# Patient Record
Sex: MALE | Race: WHITE | NOT HISPANIC OR LATINO | URBAN - METROPOLITAN AREA
[De-identification: names, ages, dates, MRNs, and addresses within clinical notes are randomized per-mention and may not be internally consistent; named-entity substitution may affect disease eponyms.]

---

## 2017-01-26 PROBLEM — Z00.00 ENCOUNTER FOR PREVENTIVE HEALTH EXAMINATION: Status: ACTIVE | Noted: 2017-01-26

## 2018-03-12 ENCOUNTER — INPATIENT (INPATIENT)
Facility: HOSPITAL | Age: 33
LOS: 7 days | Discharge: HOME | End: 2018-03-20
Attending: PSYCHIATRY & NEUROLOGY | Admitting: PSYCHIATRY & NEUROLOGY
Payer: COMMERCIAL

## 2018-03-12 VITALS — HEIGHT: 70 IN | WEIGHT: 212.08 LBS

## 2018-03-12 LAB
AMPHET UR-MCNC: NEGATIVE — SIGNIFICANT CHANGE UP
ANION GAP SERPL CALC-SCNC: 8 MMOL/L — SIGNIFICANT CHANGE UP (ref 5–17)
ANISOCYTOSIS BLD QL: SIGNIFICANT CHANGE UP
APAP SERPL-MCNC: < 2 UG/ML — SIGNIFICANT CHANGE UP (ref 10–30)
APPEARANCE UR: CLEAR — SIGNIFICANT CHANGE UP
BARBITURATES UR SCN-MCNC: NEGATIVE — SIGNIFICANT CHANGE UP
BASOPHILS # BLD AUTO: 0.02 K/UL — SIGNIFICANT CHANGE UP (ref 0–0.2)
BASOPHILS NFR BLD AUTO: 0.2 % — SIGNIFICANT CHANGE UP (ref 0–2)
BENZODIAZ UR-MCNC: NEGATIVE — SIGNIFICANT CHANGE UP
BILIRUB UR-MCNC: NEGATIVE — SIGNIFICANT CHANGE UP
BUN SERPL-MCNC: 11 MG/DL — SIGNIFICANT CHANGE UP (ref 7–23)
CALCIUM SERPL-MCNC: 9.8 MG/DL — SIGNIFICANT CHANGE UP (ref 8.5–10.1)
CHLORIDE SERPL-SCNC: 104 MMOL/L — SIGNIFICANT CHANGE UP (ref 96–108)
CO2 SERPL-SCNC: 27 MMOL/L — SIGNIFICANT CHANGE UP (ref 22–31)
COCAINE METAB.OTHER UR-MCNC: NEGATIVE — SIGNIFICANT CHANGE UP
COLOR SPEC: YELLOW — SIGNIFICANT CHANGE UP
CREAT SERPL-MCNC: 0.97 MG/DL — SIGNIFICANT CHANGE UP (ref 0.5–1.3)
DACRYOCYTES BLD QL SMEAR: SLIGHT — SIGNIFICANT CHANGE UP
DIFF PNL FLD: NEGATIVE — SIGNIFICANT CHANGE UP
EOSINOPHIL # BLD AUTO: 0.29 K/UL — SIGNIFICANT CHANGE UP (ref 0–0.5)
EOSINOPHIL NFR BLD AUTO: 3.3 % — SIGNIFICANT CHANGE UP (ref 0–6)
ETHANOL SERPL-MCNC: <10 MG/DL — SIGNIFICANT CHANGE UP (ref 0–10)
GLUCOSE SERPL-MCNC: 121 MG/DL — HIGH (ref 70–99)
GLUCOSE UR QL: NEGATIVE MG/DL — SIGNIFICANT CHANGE UP
HCT VFR BLD CALC: 37.9 % — LOW (ref 39–50)
HGB BLD-MCNC: 11.5 G/DL — LOW (ref 13–17)
HYPOCHROMIA BLD QL: SLIGHT — SIGNIFICANT CHANGE UP
IMM GRANULOCYTES NFR BLD AUTO: 1.1 % — SIGNIFICANT CHANGE UP (ref 0–1.5)
KETONES UR-MCNC: NEGATIVE — SIGNIFICANT CHANGE UP
LEUKOCYTE ESTERASE UR-ACNC: (no result)
LG PLATELETS BLD QL AUTO: SLIGHT — SIGNIFICANT CHANGE UP
LYMPHOCYTES # BLD AUTO: 2.33 K/UL — SIGNIFICANT CHANGE UP (ref 1–3.3)
LYMPHOCYTES # BLD AUTO: 26.4 % — SIGNIFICANT CHANGE UP (ref 13–44)
MACROCYTES BLD QL: SLIGHT — SIGNIFICANT CHANGE UP
MANUAL SMEAR VERIFICATION: SIGNIFICANT CHANGE UP
MCHC RBC-ENTMCNC: 20.2 PG — LOW (ref 27–34)
MCHC RBC-ENTMCNC: 30.3 GM/DL — LOW (ref 32–36)
MCV RBC AUTO: 66.6 FL — LOW (ref 80–100)
METHADONE UR-MCNC: NEGATIVE — SIGNIFICANT CHANGE UP
MICROCYTES BLD QL: SLIGHT — SIGNIFICANT CHANGE UP
MONOCYTES # BLD AUTO: 0.71 K/UL — SIGNIFICANT CHANGE UP (ref 0–0.9)
MONOCYTES NFR BLD AUTO: 8 % — SIGNIFICANT CHANGE UP (ref 2–14)
NEUTROPHILS # BLD AUTO: 5.39 K/UL — SIGNIFICANT CHANGE UP (ref 1.8–7.4)
NEUTROPHILS NFR BLD AUTO: 61 % — SIGNIFICANT CHANGE UP (ref 43–77)
NITRITE UR-MCNC: NEGATIVE — SIGNIFICANT CHANGE UP
NRBC # BLD: 0 /100 WBCS — SIGNIFICANT CHANGE UP (ref 0–0)
OPIATES UR-MCNC: NEGATIVE — SIGNIFICANT CHANGE UP
PCP SPEC-MCNC: SIGNIFICANT CHANGE UP
PCP UR-MCNC: NEGATIVE — SIGNIFICANT CHANGE UP
PH UR: 7 — SIGNIFICANT CHANGE UP (ref 5–8)
PLAT MORPH BLD: NORMAL — SIGNIFICANT CHANGE UP
PLATELET # BLD AUTO: 452 K/UL — HIGH (ref 150–400)
POIKILOCYTOSIS BLD QL AUTO: SLIGHT — SIGNIFICANT CHANGE UP
POTASSIUM SERPL-MCNC: 3.8 MMOL/L — SIGNIFICANT CHANGE UP (ref 3.5–5.3)
POTASSIUM SERPL-SCNC: 3.8 MMOL/L — SIGNIFICANT CHANGE UP (ref 3.5–5.3)
PROT UR-MCNC: NEGATIVE MG/DL — SIGNIFICANT CHANGE UP
RBC # BLD: 5.69 M/UL — SIGNIFICANT CHANGE UP (ref 4.2–5.8)
RBC # FLD: 16.4 % — HIGH (ref 10.3–14.5)
RBC BLD AUTO: (no result)
SALICYLATES SERPL-MCNC: <1.7 MG/DL — LOW (ref 2.8–20)
SODIUM SERPL-SCNC: 139 MMOL/L — SIGNIFICANT CHANGE UP (ref 135–145)
SP GR SPEC: 1.01 — SIGNIFICANT CHANGE UP (ref 1.01–1.02)
TARGETS BLD QL SMEAR: SLIGHT — SIGNIFICANT CHANGE UP
THC UR QL: NEGATIVE — SIGNIFICANT CHANGE UP
UROBILINOGEN FLD QL: NEGATIVE MG/DL — SIGNIFICANT CHANGE UP
WBC # BLD: 8.84 K/UL — SIGNIFICANT CHANGE UP (ref 3.8–10.5)
WBC # FLD AUTO: 8.84 K/UL — SIGNIFICANT CHANGE UP (ref 3.8–10.5)

## 2018-03-12 PROCEDURE — 99285 EMERGENCY DEPT VISIT HI MDM: CPT

## 2018-03-12 PROCEDURE — 90792 PSYCH DIAG EVAL W/MED SRVCS: CPT | Mod: GT

## 2018-03-12 PROCEDURE — 93010 ELECTROCARDIOGRAM REPORT: CPT

## 2018-03-12 NOTE — ED PROVIDER NOTE - CARE PLAN
Principal Discharge DX:	Suicidal ideations  Secondary Diagnosis:	Depression, unspecified depression type  Secondary Diagnosis:	Bipolar affective disorder, remission status unspecified

## 2018-03-12 NOTE — ED PROVIDER NOTE - OBJECTIVE STATEMENT
32 male with PMHx of anxiety, depression, Bipolar disorder presents to the ED c/o worsening SI over the last few days. Pt states that he has "come close" to a plan in the last few days, prompting ED visit. +depression. Denies CP, abd pain, SOB, fever. Lexapro recently added for worsening sx, has been taking for 3 days. Notes increased stress that pt is having difficulty coping with. Hospitalized in Oct 2013 for depressive sx. No previous suicidal attempts. Last saw psychiatrist last week. No EtOH, nonsmoker. 31 y/o male with PMHx of anxiety, depression, Bipolar disorder presents to the ED c/o worsening SI over the last few days. Pt states that he has "come close" to a plan in the last few days, prompting ED visit. +depression. Denies CP, abd pain, SOB, fever. Lexapro recently added for worsening sx, has been taking for 3 days. Notes increased stress that pt is having difficulty coping with. Hospitalized in Oct 2013 for depressive sx. No previous suicidal attempts. Last saw psychiatrist last week. No EtOH, nonsmoker.

## 2018-03-12 NOTE — ED PROVIDER NOTE - NS_ ATTENDINGSCRIBEDETAILS _ED_A_ED_FT
I, Laith Mg MD,  performed the initial face to face bedside interview with this patient regarding history of present illness, review of symptoms and relevant past medical, social and family history.  I completed an independent physical examination.    The history, relevant review of systems, past medical and surgical history, medical decision making, and physical examination was documented by the scribe in my presence and I attest to the accuracy of the documentation.

## 2018-03-12 NOTE — ED PROVIDER NOTE - PROGRESS NOTE DETAILS
ED Attending Dr. Mg spoke with telepsych who states that pt will be added to list of patients to be seen in ED. pt evaluated by telepsych and recommend admission for further w/u.  no current beds available.  will hold in ED until bed available s/o to [place] to f/u with psych placement
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
## 2018-03-12 NOTE — ED ADULT TRIAGE NOTE - CHIEF COMPLAINT QUOTE
pt states increasing thoughts of SI since this year. in therapy for SI and states thoughts of SI are increasing.

## 2018-03-13 DIAGNOSIS — F60.3 BORDERLINE PERSONALITY DISORDER: ICD-10-CM

## 2018-03-13 DIAGNOSIS — F31.4 BIPOLAR DISORDER, CURRENT EPISODE DEPRESSED, SEVERE, WITHOUT PSYCHOTIC FEATURES: ICD-10-CM

## 2018-03-13 LAB
ADD ON TEST-SPECIMEN IN LAB: SIGNIFICANT CHANGE UP
BACTERIA # UR AUTO: (no result)
EPI CELLS # UR: SIGNIFICANT CHANGE UP
LITHIUM SERPL-MCNC: 0.7 MMOL/L — SIGNIFICANT CHANGE UP (ref 0.6–1.2)
RBC CASTS # UR COMP ASSIST: SIGNIFICANT CHANGE UP /HPF (ref 0–4)
WBC UR QL: SIGNIFICANT CHANGE UP

## 2018-03-13 PROCEDURE — 93010 ELECTROCARDIOGRAM REPORT: CPT

## 2018-03-13 RX ORDER — BUPROPION HYDROCHLORIDE 150 MG/1
300 TABLET, EXTENDED RELEASE ORAL DAILY
Qty: 0 | Refills: 0 | Status: DISCONTINUED | OUTPATIENT
Start: 2018-03-13 | End: 2018-03-20

## 2018-03-13 RX ORDER — ACETAMINOPHEN 500 MG
650 TABLET ORAL EVERY 6 HOURS
Qty: 0 | Refills: 0 | Status: DISCONTINUED | OUTPATIENT
Start: 2018-03-13 | End: 2018-03-20

## 2018-03-13 RX ORDER — LITHIUM CARBONATE 300 MG/1
600 TABLET, EXTENDED RELEASE ORAL
Qty: 0 | Refills: 0 | Status: DISCONTINUED | OUTPATIENT
Start: 2018-03-13 | End: 2018-03-14

## 2018-03-13 RX ORDER — DIPHENHYDRAMINE HCL 50 MG
50 CAPSULE ORAL ONCE
Qty: 0 | Refills: 0 | Status: DISCONTINUED | OUTPATIENT
Start: 2018-03-13 | End: 2018-03-20

## 2018-03-13 RX ORDER — LAMOTRIGINE 25 MG/1
50 TABLET, ORALLY DISINTEGRATING ORAL DAILY
Qty: 0 | Refills: 0 | Status: DISCONTINUED | OUTPATIENT
Start: 2018-03-13 | End: 2018-03-14

## 2018-03-13 RX ORDER — TRAZODONE HCL 50 MG
50 TABLET ORAL AT BEDTIME
Qty: 0 | Refills: 0 | Status: DISCONTINUED | OUTPATIENT
Start: 2018-03-13 | End: 2018-03-14

## 2018-03-13 RX ORDER — FLUOXETINE HCL 10 MG
60 CAPSULE ORAL DAILY
Qty: 0 | Refills: 0 | Status: DISCONTINUED | OUTPATIENT
Start: 2018-03-13 | End: 2018-03-15

## 2018-03-13 RX ORDER — HALOPERIDOL DECANOATE 100 MG/ML
5 INJECTION INTRAMUSCULAR ONCE
Qty: 0 | Refills: 0 | Status: DISCONTINUED | OUTPATIENT
Start: 2018-03-13 | End: 2018-03-20

## 2018-03-13 RX ORDER — ESCITALOPRAM OXALATE 10 MG/1
10 TABLET, FILM COATED ORAL DAILY
Qty: 0 | Refills: 0 | Status: DISCONTINUED | OUTPATIENT
Start: 2018-03-13 | End: 2018-03-13

## 2018-03-13 RX ADMIN — BUPROPION HYDROCHLORIDE 300 MILLIGRAM(S): 150 TABLET, EXTENDED RELEASE ORAL at 12:14

## 2018-03-13 RX ADMIN — LITHIUM CARBONATE 600 MILLIGRAM(S): 300 TABLET, EXTENDED RELEASE ORAL at 22:26

## 2018-03-13 RX ADMIN — LAMOTRIGINE 50 MILLIGRAM(S): 25 TABLET, ORALLY DISINTEGRATING ORAL at 12:14

## 2018-03-13 RX ADMIN — Medication 60 MILLIGRAM(S): at 12:14

## 2018-03-13 RX ADMIN — ESCITALOPRAM OXALATE 10 MILLIGRAM(S): 10 TABLET, FILM COATED ORAL at 12:14

## 2018-03-13 NOTE — ED BEHAVIORAL HEALTH ASSESSMENT NOTE - RISK ASSESSMENT
Currently an acute risk to self. Pt recently had a specific suicide plan with ambivalent intent. Higher level of care warranted.

## 2018-03-13 NOTE — ED BEHAVIORAL HEALTH ASSESSMENT NOTE - PATIENT'S CHIEF COMPLAINT
"The past few months have been particularly stressful. I came to a point where I had a specific suicidal plan."

## 2018-03-13 NOTE — ED BEHAVIORAL HEALTH ASSESSMENT NOTE - DESCRIPTION
Calm, cooperative in ED    Vital Signs Last 24 Hrs  T(C): 36.7 (12 Mar 2018 17:56), Max: 36.7 (12 Mar 2018 17:56)  T(F): 98.1 (12 Mar 2018 17:56), Max: 98.1 (12 Mar 2018 17:56)  HR: 101 (12 Mar 2018 17:56) (101 - 101)  BP: 132/90 (12 Mar 2018 17:56) (132/90 - 132/90)  BP(mean): --  RR: 20 (12 Mar 2018 17:56) (20 - 20)  SpO2: 97% (12 Mar 2018 17:56) (97% - 97%) PRASANNA Lives with  and their 15 month old son and works for [employer].

## 2018-03-13 NOTE — ED BEHAVIORAL HEALTH ASSESSMENT NOTE - PSYCHIATRIC ISSUES AND PLAN (INCLUDE STANDING AND PRN MEDICATION)
Depressed and suicidal with plan. Depressed and suicidal with plan. Continue 1:1 while in ED. Continue all home meds (Wellbutrin  mg QAM, lithium 600 mg BID - pending level, Prozac 60 mg QAM and 20 mg QHS, Lexapro 10 mg QAM, Lamictal 50 mg QAM)
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
## 2018-03-13 NOTE — ED BEHAVIORAL HEALTH ASSESSMENT NOTE - OTHER PAST PSYCHIATRIC HISTORY (INCLUDE DETAILS REGARDING ONSET, COURSE OF ILLNESS, INPATIENT/OUTPATIENT TREATMENT)
One inpatient admission in 2013 at [facility] for a similar reason (depression with SI). No hx of suicide attempts or self harm behavior. No hx of aggression/violence.

## 2018-03-13 NOTE — ED BEHAVIORAL HEALTH ASSESSMENT NOTE - NS ED BHA PLAN HOLD IN ED BH CONTACTED FT
Msg left for the therapist, Chloe Carrera, at [facility] [phone]. Msg left for the therapist, Sydnie Carrera, and psychiatrist, Dr. Monica Perdomo, at [facility] [phone].

## 2018-03-13 NOTE — H&P ADULT - NSHPLABSRESULTS_GEN_ALL_CORE
LABS:                        11.5   8.84  )-----------( 452      ( 12 Mar 2018 19:22 )             37.9     03-12    139  |  104  |  11  ----------------------------<  121<H>  3.8   |  27  |  0.97    Ca    9.8      12 Mar 2018 19:22  Urinalysis Basic - ( 12 Mar 2018 22:27 )    Color: Yellow / Appearance: Clear / S.010 / pH: x  Gluc: x / Ketone: Negative  / Bili: Negative / Urobili: Negative mg/dL   Blood: x / Protein: Negative mg/dL / Nitrite: Negative   Leuk Esterase: Trace / RBC: 0-5 /HPF / WBC 3-5   Sq Epi: x / Non Sq Epi: Few / Bacteria: Occasional    EKG: sinus, no acute ischemic changes, QTc-482
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
## 2018-03-13 NOTE — ED BEHAVIORAL HEALTH ASSESSMENT NOTE - SUICIDE PROTECTIVE FACTORS
Identifies reasons for living/Engaged in work or school/Responsibility to family and others/Supportive social network or family

## 2018-03-13 NOTE — H&P ADULT - NSHPPHYSICALEXAM_GEN_ALL_CORE
Vital Signs Last 24 Hrs  T(C): 37.3 (13 Mar 2018 11:44), Max: 37.3 (13 Mar 2018 11:44)  T(F): 99.2 (13 Mar 2018 11:44), Max: 99.2 (13 Mar 2018 11:44)  HR: 86 (13 Mar 2018 11:44) (74 - 101)  BP: 121/76 (13 Mar 2018 11:44) (116/75 - 132/90)  RR: 14 (13 Mar 2018 11:44) (14 - 20)  SpO2: 97% (13 Mar 2018 11:44) (97% - 97%)    PHYSICAL EXAM:    GENERAL: Comfortable, no acute distress   HEAD:  Normocephalic, atraumatic  EYES: EOMI, PERRLA  HEENT: Moist mucous membranes  NECK: Supple, No JVD  NERVOUS SYSTEM:  Alert & Oriented X3, Motor Strength 5/5 B/L upper and lower extremities  CHEST/LUNG: Clear to auscultation bilaterally  HEART: Regular rate and rhythm  ABDOMEN: Soft, Nontender, Nondistended, Bowel sounds present  GENITOURINARY: Voiding, no palpable bladder  EXTREMITIES:   No clubbing, cyanosis, or edema  MUSCULOSKELETAL- No muscle tenderness, no joint tenderness  SKIN-no rash

## 2018-03-13 NOTE — ED BEHAVIORAL HEALTH ASSESSMENT NOTE - OTHER
Yissel Bermeo [employer]erred Marital discord n/a Multiple stressors: marital issues, lack of money, stress of raising a baby, job related issues

## 2018-03-13 NOTE — H&P ADULT - ASSESSMENT
32M, pmh as above a/w:    1. Depression/suicidal ideation/Anxiety, h/o bipolar/borderline personality:  -on lithium, wellbutrin, lexapro, lamictal  -Mx per psychiatry    2. Prolonged QT:  -repeat EKG today  -if still prolonged, may need to stop lexapro (new med), trazodone, haldol  -also may need to hold fluoxetine    3. Thalasemmia:  -outpt f/u    4. PRASANNA:   -cpap HS    5.

## 2018-03-13 NOTE — ED BEHAVIORAL HEALTH ASSESSMENT NOTE - PRIMARY DX
Bipolar disorder, current episode depressed, severe, without psychotic features Borderline personality disorder

## 2018-03-13 NOTE — CHART NOTE - NSCHARTNOTEFT_GEN_A_CORE
Repeat EKG noted, QTc 475  D/w Dr. Jefferson to consider dc lexapro/ adjusting meds  Repeat EKG in am.

## 2018-03-13 NOTE — ED BEHAVIORAL HEALTH ASSESSMENT NOTE - DIFFERENTIAL
Bipolar d/o most recent episode depressed, severe without psychotic features, borderline personality d/o

## 2018-03-13 NOTE — ED BEHAVIORAL HEALTH ASSESSMENT NOTE - CURRENT MEDICATION
Wellbutrin  mg QAM, Prozac 60 mg QAM and 20 mg QHS, Lexapro 10 mg QAM (recently started; being cross titrated with Prozac), lithium 600 mg BID, Lamictal 50 mg QAM

## 2018-03-13 NOTE — ED BEHAVIORAL HEALTH ASSESSMENT NOTE - SUMMARY
31 y/o  male, with 15 month old son, with hx of bipolar d/o, anxiety d/o, borderline personality d/o, and one prior hospitalization, compliant with meds currently, presenting with worsening depressive sx, including SI with specific plan which "solidified" in recent days, in context of multiple psychosocial stressors. Pt reports feeling unsafe with these thoughts and he is requesting voluntary hospitalization.     Unfortunately no bed available at this facility or other nearby hospital at this time. Will hold in ED until a bed is found. Continue all home meds.

## 2018-03-13 NOTE — ED BEHAVIORAL HEALTH ASSESSMENT NOTE - HPI (INCLUDE ILLNESS QUALITY, SEVERITY, DURATION, TIMING, CONTEXT, MODIFYING FACTORS, ASSOCIATED SIGNS AND SYMPTOMS)
31 y/o , employed  male, domiciled with his  and their 15 months old son, with hx of bipolar d/o, anxiety, borderline personality d/o and one hospitalization in 2013 at [facility], BIB , at the urging ot pt's therapist, due to worsening depressive sx, including SI. Pt is currently in treatment at [facility] and compliant with meds (Wellbutrin, Prozac/Lexapro, lithium, Lamictal). Pt has no hx of suicidal or self harm behavior, no hx of aggression/violence and no hx of substance abuse or legal issues.     On exam pt presents calm, cooperative and open. Reports he's been increasingly stressed and depressed in recent months citing multiple psychosocial stressors (marital discord, financial issues, work issues and the stress of raising a baby). The biggest stressor has been his marriage. He and his  of 5 years (together for 12 years) have been arguing a lot.  In such setting pt has been experiencing suicidal thoughts, specifically thoughts of buying snacks from [employer], checking into to a motel, eat the snacks and overdose on an entire bottle of pills. States the thoughts started in Nov of last year and were pretty vague until this past Friday, when after a particularly bad argument with the , the thoughts "solidified." He did not however act on the thoughts because he was never physically alone this weekend, at all times surrounded by his  or friends.  Also states the intent was ambivalent. Pt does note however that if there had been one more acute stressor at that time, in addition to the argument with , he probably would have gone through with the suicide plan.     Denies current or recent manic sx. Denies current and history of psychotic sx. No substance abuse hx; does not use any illicit substances and drinks about once a month.     Please find below collateral obtained by [facility] from pt's :  Spoke with patient’s spouse Damon ([phone]) for collateral. Per spouse patient was at baseline consisting of chronic Bipolar disorder, depression, and borderline personality disorder until the past few weeks where patient has been increasingly depressed, having more frequent suicidal thoughts, is more irritable, sleeping less (4hrs/night), not performing chores, easily frustrated and missing multiple days of work a week. Spouse reports patient has not expressed a plan to him but that patient indicated to his psychiatrist and ED staff that he had a plan, states patient has had suicidal plans in the past but no attempts. Spouse reports last week after an argument patient went to the bathroom and superficially cut his arm reporting he initially planned to kill himself but backed out. Spouse states patient went to [facility] during the day and it was recommended spouse take patient to the ED. Patient sees Dr. Perdomo x1/mo and [facility], is currently taking Lithium 300mg x4/day, Prozac 20mg, Lexapro 10mg, and Wellbutrin 300mg. Spouse states patient’s only other admission was around 2013 at [facility] for depression. Spouse reports patient does not have friends or family involved, patient prefers to be alone playing video games and reading. Spouse denies any history of homicidal ideation or violence, no substance abuse, no known past abuse, mother left patient at an early age and patient lived on his own at 19 years old, no access to weapons, family history of mother who had unknown mental health issues.  Spouse does not believe patient is a danger to self or others but is supportive if patient would like to be admitted.

## 2018-03-14 DIAGNOSIS — F33.1 MAJOR DEPRESSIVE DISORDER, RECURRENT, MODERATE: ICD-10-CM

## 2018-03-14 LAB
ALBUMIN SERPL ELPH-MCNC: 4.4 G/DL — SIGNIFICANT CHANGE UP (ref 3.3–5)
ALP SERPL-CCNC: 116 U/L — SIGNIFICANT CHANGE UP (ref 40–120)
ALT FLD-CCNC: 264 U/L — HIGH (ref 12–78)
AST SERPL-CCNC: 166 U/L — HIGH (ref 15–37)
BILIRUB DIRECT SERPL-MCNC: 0.4 MG/DL — HIGH (ref 0–0.2)
BILIRUB INDIRECT FLD-MCNC: 0.6 MG/DL — SIGNIFICANT CHANGE UP (ref 0.2–1)
BILIRUB SERPL-MCNC: 1 MG/DL — SIGNIFICANT CHANGE UP (ref 0.2–1.2)
PROT SERPL-MCNC: 8.2 GM/DL — SIGNIFICANT CHANGE UP (ref 6–8.3)
TSH SERPL-MCNC: 2.29 UIU/ML — SIGNIFICANT CHANGE UP (ref 0.36–3.74)

## 2018-03-14 PROCEDURE — 93010 ELECTROCARDIOGRAM REPORT: CPT

## 2018-03-14 RX ORDER — ESCITALOPRAM OXALATE 10 MG/1
10 TABLET, FILM COATED ORAL DAILY
Qty: 0 | Refills: 0 | Status: DISCONTINUED | OUTPATIENT
Start: 2018-03-15 | End: 2018-03-19

## 2018-03-14 RX ORDER — QUETIAPINE FUMARATE 200 MG/1
100 TABLET, FILM COATED ORAL DAILY
Qty: 0 | Refills: 0 | Status: DISCONTINUED | OUTPATIENT
Start: 2018-03-14 | End: 2018-03-20

## 2018-03-14 RX ORDER — QUETIAPINE FUMARATE 200 MG/1
400 TABLET, FILM COATED ORAL AT BEDTIME
Qty: 0 | Refills: 0 | Status: DISCONTINUED | OUTPATIENT
Start: 2018-03-14 | End: 2018-03-20

## 2018-03-14 RX ORDER — LITHIUM CARBONATE 300 MG/1
300 TABLET, EXTENDED RELEASE ORAL
Qty: 0 | Refills: 0 | Status: DISCONTINUED | OUTPATIENT
Start: 2018-03-14 | End: 2018-03-17

## 2018-03-14 RX ORDER — ESCITALOPRAM OXALATE 10 MG/1
10 TABLET, FILM COATED ORAL DAILY
Qty: 0 | Refills: 0 | Status: DISCONTINUED | OUTPATIENT
Start: 2018-03-14 | End: 2018-03-14

## 2018-03-14 RX ORDER — HYDROXYZINE HCL 10 MG
50 TABLET ORAL AT BEDTIME
Qty: 0 | Refills: 0 | Status: DISCONTINUED | OUTPATIENT
Start: 2018-03-14 | End: 2018-03-20

## 2018-03-14 RX ORDER — LAMOTRIGINE 25 MG/1
75 TABLET, ORALLY DISINTEGRATING ORAL DAILY
Qty: 0 | Refills: 0 | Status: DISCONTINUED | OUTPATIENT
Start: 2018-03-15 | End: 2018-03-17

## 2018-03-14 RX ADMIN — LITHIUM CARBONATE 600 MILLIGRAM(S): 300 TABLET, EXTENDED RELEASE ORAL at 09:16

## 2018-03-14 RX ADMIN — Medication 50 MILLIGRAM(S): at 00:05

## 2018-03-14 RX ADMIN — Medication 60 MILLIGRAM(S): at 09:16

## 2018-03-14 RX ADMIN — QUETIAPINE FUMARATE 100 MILLIGRAM(S): 200 TABLET, FILM COATED ORAL at 12:33

## 2018-03-14 RX ADMIN — QUETIAPINE FUMARATE 400 MILLIGRAM(S): 200 TABLET, FILM COATED ORAL at 20:57

## 2018-03-14 RX ADMIN — Medication 1 MILLIGRAM(S): at 00:34

## 2018-03-14 RX ADMIN — BUPROPION HYDROCHLORIDE 300 MILLIGRAM(S): 150 TABLET, EXTENDED RELEASE ORAL at 09:16

## 2018-03-14 RX ADMIN — LITHIUM CARBONATE 300 MILLIGRAM(S): 300 TABLET, EXTENDED RELEASE ORAL at 20:57

## 2018-03-14 RX ADMIN — LAMOTRIGINE 50 MILLIGRAM(S): 25 TABLET, ORALLY DISINTEGRATING ORAL at 09:16

## 2018-03-15 LAB
CHOLEST SERPL-MCNC: 210 MG/DL — HIGH (ref 10–199)
GLUCOSE SERPL-MCNC: 99 MG/DL — SIGNIFICANT CHANGE UP (ref 70–99)
HDLC SERPL-MCNC: 41 MG/DL — SIGNIFICANT CHANGE UP (ref 40–125)
LIPID PNL WITH DIRECT LDL SERPL: 110 MG/DL — SIGNIFICANT CHANGE UP
TOTAL CHOLESTEROL/HDL RATIO MEASUREMENT: 5.1 RATIO — SIGNIFICANT CHANGE UP (ref 3.4–9.6)
TRIGL SERPL-MCNC: 294 MG/DL — HIGH (ref 10–149)

## 2018-03-15 PROCEDURE — 93010 ELECTROCARDIOGRAM REPORT: CPT

## 2018-03-15 RX ORDER — FLUOXETINE HCL 10 MG
40 CAPSULE ORAL DAILY
Qty: 0 | Refills: 0 | Status: DISCONTINUED | OUTPATIENT
Start: 2018-03-16 | End: 2018-03-17

## 2018-03-15 RX ADMIN — Medication 60 MILLIGRAM(S): at 08:42

## 2018-03-15 RX ADMIN — ESCITALOPRAM OXALATE 10 MILLIGRAM(S): 10 TABLET, FILM COATED ORAL at 08:42

## 2018-03-15 RX ADMIN — LITHIUM CARBONATE 300 MILLIGRAM(S): 300 TABLET, EXTENDED RELEASE ORAL at 21:19

## 2018-03-15 RX ADMIN — QUETIAPINE FUMARATE 100 MILLIGRAM(S): 200 TABLET, FILM COATED ORAL at 08:42

## 2018-03-15 RX ADMIN — BUPROPION HYDROCHLORIDE 300 MILLIGRAM(S): 150 TABLET, EXTENDED RELEASE ORAL at 08:42

## 2018-03-15 RX ADMIN — QUETIAPINE FUMARATE 400 MILLIGRAM(S): 200 TABLET, FILM COATED ORAL at 21:18

## 2018-03-15 RX ADMIN — LAMOTRIGINE 75 MILLIGRAM(S): 25 TABLET, ORALLY DISINTEGRATING ORAL at 08:42

## 2018-03-15 RX ADMIN — LITHIUM CARBONATE 300 MILLIGRAM(S): 300 TABLET, EXTENDED RELEASE ORAL at 08:42

## 2018-03-15 NOTE — CONSULT NOTE ADULT - ASSESSMENT
Imp:  Elevated LFTs, presumably from fatty liver    Rec:  repeat serologic workup for liver disease  24 hour urinary copper -- r/o w[facility]ons  abdominal ultrasound

## 2018-03-15 NOTE — PROGRESS NOTE BEHAVIORAL HEALTH - NSBHCHARTREVIEWINVESTIGATE_PSY_A_CORE FT
< from: 12 Lead ECG (03.15.18 @ 07:40) >    Diagnosis Line Normal sinus rhythm  Prolonged QT  Abnormal ECG  When compared with ECG of 14-MAR-2018 07:35,  No significant change was found  Confirmed by Panda Rashid MD ([id number]) on 3/15/2018 8:31:14 AM    < end of copied text >
< from: 12 Lead ECG (03.14.18 @ 07:35) >    Prolonged QT  Abnormal ECG  When compared with ECG of 13-MAR-2018 11:13,  No significant change was found  Confirmed by Panda Rashid MD ([id number]) on 3/14/2018 8:11:19 A

## 2018-03-15 NOTE — CONSULT NOTE ADULT - SUBJECTIVE AND OBJECTIVE BOX
HPI:  32M, pmh of sleep apnea on cpap, anxiety, bipolar, borderline personality d/o, thalasemmia, hiatal hernia s/p repair who presented to the ER with suicidal thoughts, depressed mood. Has been under a lot of stress with 15month old baby and spousal disagreements. He did think about overdosing on pills. He was evaluated in the ER and admitted to .   He denies any f/c/r/sob/chest pain/n/v/d/abd pain/dysuria. States he was recently started on lexapro.   --------------  Here, noted to have elevated LFTs which he's had for years. Liver biopsy 2016 with marked steatosis but no inflammation or fibrosis. No alcohol.    ROS: all 10 systems reviewed and is as above otherwise negative    PMH: as above  PSH: hiatal hernia repair  F/H: denies any significant medical conditions in immediate family members  Social: no tobacco/etoh use (13 Mar 2018 12:11)    MEDICATIONS  (STANDING):  buPROPion  milliGRAM(s) Oral daily  escitalopram 10 milliGRAM(s) Oral daily  lamoTRIgine 75 milliGRAM(s) Oral daily  lithium 300 milliGRAM(s) Oral two times a day  QUEtiapine 100 milliGRAM(s) Oral daily  QUEtiapine 400 milliGRAM(s) Oral at bedtime    MEDICATIONS  (PRN):  acetaminophen   Tablet. 650 milliGRAM(s) Oral every 6 hours PRN Moderate Pain (4 - 6)  aluminum hydroxide/magnesium hydroxide/simethicone Suspension 30 milliLiter(s) Oral every 6 hours PRN Dyspepsia  diphenhydrAMINE   Injectable 50 milliGRAM(s) IntraMuscular once PRN Agitation  haloperidol    Injectable 5 milliGRAM(s) IntraMuscular once PRN Agitation  hydrOXYzine hydrochloride 50 milliGRAM(s) Oral at bedtime PRN insomnia  LORazepam     Tablet 1 milliGRAM(s) Oral every 8 hours PRN Anxiety  LORazepam   Injectable 2 milliGRAM(s) IntraMuscular once PRN Agitation      Allergies    No Known Allergies    Intolerances          ROS  As above  Otherwise unremarkable    Vital Signs Last 24 Hrs  T(C): 36.7 (15 Mar 2018 08:33), Max: 36.7 (15 Mar 2018 08:33)  T(F): 98 (15 Mar 2018 08:33), Max: 98 (15 Mar 2018 08:33)  HR: 83 (15 Mar 2018 08:33) (83 - 83)  BP: 128/82 (15 Mar 2018 08:33) (128/82 - 128/82)  BP(mean): --  RR: 14 (15 Mar 2018 08:33) (14 - 14)  SpO2: 98% (15 Mar 2018 08:33) (98% - 98%)    Constitutional: NAD, well-developed  Respiratory: CTAB  Cardiovascular: S1 and S2, RRR  Gastrointestinal: BS+, soft, NT/ND  Extremities: No peripheral edema  Psychiatric: Normal mood, normal affect  Skin: No rashes    LABS:    03-15    x   |  x   |  x   ----------------------------<  99  x    |  x   |  x       TPro  8.2  /  Alb  4.4  /  TBili  1.0  /  DBili  0.4<H>  /  AST  166<H>  /  ALT  264<H>  /  AlkPhos  116  03-14      LIVER FUNCTIONS - ( 14 Mar 2018 06:35 )  Alb: 4.4 g/dL / Pro: 8.2 gm/dL / ALK PHOS: 116 U/L / ALT: 264 U/L / AST: 166 U/L / GGT: x             RADIOLOGY & ADDITIONAL STUDIES:

## 2018-03-16 LAB
CERULOPLASMIN SERPL-MCNC: 28 MG/DL — SIGNIFICANT CHANGE UP (ref 20–60)
FERRITIN SERPL-MCNC: 659 NG/ML — HIGH (ref 30–400)
HAV IGM SER-ACNC: SIGNIFICANT CHANGE UP
HBV CORE IGM SER-ACNC: SIGNIFICANT CHANGE UP
HBV SURFACE AG SER-ACNC: SIGNIFICANT CHANGE UP
HCV AB S/CO SERPL IA: 0.14 S/CO — SIGNIFICANT CHANGE UP
HCV AB SERPL-IMP: SIGNIFICANT CHANGE UP
IRON SATN MFR SERPL: 23 % — SIGNIFICANT CHANGE UP (ref 16–55)
IRON SATN MFR SERPL: 95 UG/DL — SIGNIFICANT CHANGE UP (ref 45–165)
TIBC SERPL-MCNC: 406 UG/DL — SIGNIFICANT CHANGE UP (ref 220–430)
UIBC SERPL-MCNC: 311 UG/DL — SIGNIFICANT CHANGE UP (ref 110–370)

## 2018-03-16 PROCEDURE — 76700 US EXAM ABDOM COMPLETE: CPT | Mod: 26

## 2018-03-16 RX ADMIN — LITHIUM CARBONATE 300 MILLIGRAM(S): 300 TABLET, EXTENDED RELEASE ORAL at 20:53

## 2018-03-16 RX ADMIN — ESCITALOPRAM OXALATE 10 MILLIGRAM(S): 10 TABLET, FILM COATED ORAL at 08:44

## 2018-03-16 RX ADMIN — LITHIUM CARBONATE 300 MILLIGRAM(S): 300 TABLET, EXTENDED RELEASE ORAL at 08:44

## 2018-03-16 RX ADMIN — LAMOTRIGINE 75 MILLIGRAM(S): 25 TABLET, ORALLY DISINTEGRATING ORAL at 08:44

## 2018-03-16 RX ADMIN — BUPROPION HYDROCHLORIDE 300 MILLIGRAM(S): 150 TABLET, EXTENDED RELEASE ORAL at 08:44

## 2018-03-16 RX ADMIN — QUETIAPINE FUMARATE 100 MILLIGRAM(S): 200 TABLET, FILM COATED ORAL at 08:44

## 2018-03-16 RX ADMIN — QUETIAPINE FUMARATE 400 MILLIGRAM(S): 200 TABLET, FILM COATED ORAL at 20:53

## 2018-03-16 RX ADMIN — Medication 40 MILLIGRAM(S): at 08:44

## 2018-03-17 RX ORDER — FLUOXETINE HCL 10 MG
20 CAPSULE ORAL DAILY
Qty: 0 | Refills: 0 | Status: DISCONTINUED | OUTPATIENT
Start: 2018-03-17 | End: 2018-03-19

## 2018-03-17 RX ORDER — LAMOTRIGINE 25 MG/1
100 TABLET, ORALLY DISINTEGRATING ORAL DAILY
Qty: 0 | Refills: 0 | Status: DISCONTINUED | OUTPATIENT
Start: 2018-03-17 | End: 2018-03-20

## 2018-03-17 RX ORDER — LITHIUM CARBONATE 300 MG/1
300 TABLET, EXTENDED RELEASE ORAL AT BEDTIME
Qty: 0 | Refills: 0 | Status: DISCONTINUED | OUTPATIENT
Start: 2018-03-17 | End: 2018-03-19

## 2018-03-17 RX ADMIN — QUETIAPINE FUMARATE 100 MILLIGRAM(S): 200 TABLET, FILM COATED ORAL at 09:04

## 2018-03-17 RX ADMIN — Medication 40 MILLIGRAM(S): at 09:04

## 2018-03-17 RX ADMIN — ESCITALOPRAM OXALATE 10 MILLIGRAM(S): 10 TABLET, FILM COATED ORAL at 09:04

## 2018-03-17 RX ADMIN — QUETIAPINE FUMARATE 400 MILLIGRAM(S): 200 TABLET, FILM COATED ORAL at 21:13

## 2018-03-17 RX ADMIN — LITHIUM CARBONATE 300 MILLIGRAM(S): 300 TABLET, EXTENDED RELEASE ORAL at 09:04

## 2018-03-17 RX ADMIN — LAMOTRIGINE 75 MILLIGRAM(S): 25 TABLET, ORALLY DISINTEGRATING ORAL at 09:04

## 2018-03-17 RX ADMIN — BUPROPION HYDROCHLORIDE 300 MILLIGRAM(S): 150 TABLET, EXTENDED RELEASE ORAL at 09:04

## 2018-03-17 RX ADMIN — LITHIUM CARBONATE 300 MILLIGRAM(S): 300 TABLET, EXTENDED RELEASE ORAL at 21:13

## 2018-03-17 NOTE — PROGRESS NOTE BEHAVIORAL HEALTH - NSBHCHARTREVIEWIMAGING_PSY_A_CORE FT
< from: US Abdomen Complete (03.16.18 @ 09:46) >    mpression:   Hepatosplenomegaly.    A single mobile stone in the gallbladder but no evidence of acute   cholecystitis.    Nonvisualization of the pancreas due to the bowel ga  < from: US Abdomen Complete (03.16.18 @ 09:46) >    he liver is enlarged, 26 cm in the CC diameter. Increased echogenicity   of the liver parenchyma is suggestive of liver parenchymal disease. There   is a single stone in the gallbladder but the gallbladder wall is not   thickened and there is no evidence of acute cholecystitis. CBD is 3.3 mm   in cross-sectional diameter, within normal limits.    < end of copied text >      < end of copied text >

## 2018-03-18 LAB
ANA TITR SER: NEGATIVE — SIGNIFICANT CHANGE UP
ENDOMYSIUM IGA TITR SER IF: NEGATIVE — SIGNIFICANT CHANGE UP
ENDOMYSIUM IGA TITR SER: SIGNIFICANT CHANGE UP
LAB BILL ONLY ITEM: SIGNIFICANT CHANGE UP

## 2018-03-18 RX ADMIN — QUETIAPINE FUMARATE 400 MILLIGRAM(S): 200 TABLET, FILM COATED ORAL at 21:29

## 2018-03-18 RX ADMIN — BUPROPION HYDROCHLORIDE 300 MILLIGRAM(S): 150 TABLET, EXTENDED RELEASE ORAL at 09:35

## 2018-03-18 RX ADMIN — QUETIAPINE FUMARATE 100 MILLIGRAM(S): 200 TABLET, FILM COATED ORAL at 09:35

## 2018-03-18 RX ADMIN — LITHIUM CARBONATE 300 MILLIGRAM(S): 300 TABLET, EXTENDED RELEASE ORAL at 21:29

## 2018-03-18 RX ADMIN — LAMOTRIGINE 100 MILLIGRAM(S): 25 TABLET, ORALLY DISINTEGRATING ORAL at 09:35

## 2018-03-18 RX ADMIN — Medication 20 MILLIGRAM(S): at 09:35

## 2018-03-18 RX ADMIN — ESCITALOPRAM OXALATE 10 MILLIGRAM(S): 10 TABLET, FILM COATED ORAL at 09:35

## 2018-03-18 NOTE — PROGRESS NOTE BEHAVIORAL HEALTH - NSBHCHARTREVIEWLAB_PSY_A_CORE FT
Ceruloplasmin, Serum (03.16.18 @ 06:58)    Ceruloplasmin, Serum: 28 mg/dL
Acute Hepatitis Panel (03.16.18 @ 06:58)    Hepatitis C Virus Interpretation: Nonreact: Hepatitis C AB  S/CO Ratio                        Interpretation  < 1.0                                     Non-Reactive  1.0 - 4.9                           Weakly-Reactive  > 5.0                                 Reactive  Non-Reactive: A person witha non-reactive HCV antibody result is  considered uninfected.  No further action is needed unless recent  infection is suspected.  In these cases, consider repeat testing later to  detect seroconversion..  Weakly-Reactive: HCV antibody test is abnormal, HCV RNA Qualitative test  will follow.  Reactive: HCV antibody test is abnormal, HCV RNA Qualitative test will  follow.  Note: HCV antibody testing is performed on the Abbott  system.    Hepatitis C Virus S/CO Ratio: 0.14 S/CO    Hepatitis B Core IgM Antibody: Nonreact    Hepatitis B Surface Antigen: Nonreact    Hepatitis A IgM Antibody: Nonreact      Ceruloplasmin, Serum (03.16.18 @ 06:58)    Ceruloplasmin, Serum: 28 mg/dL  Ferritin, Serum: 659 ng/mL (03.16.18 @ 06:58)  Iron with Total Binding Capacity in AM (03.16.18 @ 06:58)    Iron - Total Binding Capacity.: 406 ug/dL    % Saturation, Iron: 23 %    Iron Total, Serum: 95 ug/dL    Unsaturated Iron Binding Capacity: 311 ug/dL
Hepatic Function Panel in AM (03.14.18 @ 06:35)    Indirect Reacting Bilirubin: 0.6 mg/dL    Protein Total, Serum: 8.2 gm/dL    Albumin, Serum: 4.4 g/dL    Bilirubin Total, Serum: 1.0 mg/dL    Bilirubin Direct, Serum: 0.4 mg/dL    Alkaline Phosphatase, Serum: 116 U/L    Aspartate Aminotransferase (AST/SGOT): 166 U/L    Alanine Aminotransferase (ALT/SGPT): 264 U/L

## 2018-03-19 LAB
GLIADIN PEPTIDE IGA SER-ACNC: 5.9 UNITS — SIGNIFICANT CHANGE UP
GLIADIN PEPTIDE IGA SER-ACNC: NEGATIVE — SIGNIFICANT CHANGE UP
GLIADIN PEPTIDE IGG SER-ACNC: <5 UNITS — SIGNIFICANT CHANGE UP
GLIADIN PEPTIDE IGG SER-ACNC: NEGATIVE — SIGNIFICANT CHANGE UP
MITOCHONDRIA AB SER-ACNC: SIGNIFICANT CHANGE UP
SMOOTH MUSCLE AB SER-ACNC: SIGNIFICANT CHANGE UP
TTG IGA SER-ACNC: 5.8 UNITS — SIGNIFICANT CHANGE UP
TTG IGA SER-ACNC: NEGATIVE — SIGNIFICANT CHANGE UP

## 2018-03-19 RX ORDER — BUPROPION HYDROCHLORIDE 150 MG/1
1 TABLET, EXTENDED RELEASE ORAL
Qty: 14 | Refills: 1 | OUTPATIENT
Start: 2018-03-19 | End: 2018-04-15

## 2018-03-19 RX ORDER — ALPRAZOLAM 0.25 MG
1 TABLET ORAL
Qty: 14 | Refills: 0 | OUTPATIENT
Start: 2018-03-19

## 2018-03-19 RX ORDER — BUPROPION HYDROCHLORIDE 150 MG/1
0 TABLET, EXTENDED RELEASE ORAL
Qty: 0 | Refills: 0 | COMMUNITY

## 2018-03-19 RX ORDER — ALPRAZOLAM 0.25 MG
1 TABLET ORAL EVERY 6 HOURS
Qty: 0 | Refills: 0 | Status: DISCONTINUED | OUTPATIENT
Start: 2018-03-19 | End: 2018-03-20

## 2018-03-19 RX ORDER — ESCITALOPRAM OXALATE 10 MG/1
20 TABLET, FILM COATED ORAL DAILY
Qty: 0 | Refills: 0 | Status: DISCONTINUED | OUTPATIENT
Start: 2018-03-20 | End: 2018-03-20

## 2018-03-19 RX ORDER — LAMOTRIGINE 25 MG/1
1 TABLET, ORALLY DISINTEGRATING ORAL
Qty: 14 | Refills: 1 | OUTPATIENT
Start: 2018-03-19 | End: 2018-04-15

## 2018-03-19 RX ORDER — ESCITALOPRAM OXALATE 10 MG/1
1 TABLET, FILM COATED ORAL
Qty: 14 | Refills: 1
Start: 2018-03-19 | End: 2018-04-15

## 2018-03-19 RX ORDER — LAMOTRIGINE 25 MG/1
0 TABLET, ORALLY DISINTEGRATING ORAL
Qty: 0 | Refills: 0 | COMMUNITY

## 2018-03-19 RX ORDER — VITAMIN E 100 UNIT
800 CAPSULE ORAL DAILY
Qty: 0 | Refills: 0 | Status: DISCONTINUED | OUTPATIENT
Start: 2018-03-19 | End: 2018-03-20

## 2018-03-19 RX ORDER — LITHIUM CARBONATE 300 MG/1
0 TABLET, EXTENDED RELEASE ORAL
Qty: 0 | Refills: 0 | COMMUNITY

## 2018-03-19 RX ORDER — QUETIAPINE FUMARATE 200 MG/1
1 TABLET, FILM COATED ORAL
Qty: 14 | Refills: 1 | OUTPATIENT
Start: 2018-03-19 | End: 2018-04-15

## 2018-03-19 RX ORDER — FLUOXETINE HCL 10 MG
0 CAPSULE ORAL
Qty: 0 | Refills: 0 | COMMUNITY

## 2018-03-19 RX ADMIN — LAMOTRIGINE 100 MILLIGRAM(S): 25 TABLET, ORALLY DISINTEGRATING ORAL at 09:01

## 2018-03-19 RX ADMIN — QUETIAPINE FUMARATE 100 MILLIGRAM(S): 200 TABLET, FILM COATED ORAL at 09:01

## 2018-03-19 RX ADMIN — Medication 20 MILLIGRAM(S): at 09:00

## 2018-03-19 RX ADMIN — BUPROPION HYDROCHLORIDE 300 MILLIGRAM(S): 150 TABLET, EXTENDED RELEASE ORAL at 09:00

## 2018-03-19 RX ADMIN — QUETIAPINE FUMARATE 400 MILLIGRAM(S): 200 TABLET, FILM COATED ORAL at 20:59

## 2018-03-19 RX ADMIN — ESCITALOPRAM OXALATE 10 MILLIGRAM(S): 10 TABLET, FILM COATED ORAL at 09:01

## 2018-03-19 RX ADMIN — Medication 800 INTERNATIONAL UNIT(S): at 21:00

## 2018-03-19 NOTE — DISCHARGE NOTE BEHAVIORAL HEALTH - MEDICATION SUMMARY - MEDICATIONS TO TAKE
I will START or STAY ON the medications listed below when I get home from the hospital:    lamoTRIgine 100 mg oral tablet  -- 1 tab(s) by mouth once a day until told to discontinue by MD  -- Indication: For Mood stabilizer    Lexapro 20 mg oral tablet  -- 1 tab(s) by mouth once a day until told to discontinue by MD  -- Indication: For Depression,     QUEtiapine 100 mg oral tablet  -- 1 tab(s) by mouth once a day until told to discontinue by MD  -- Indication: For Mood stabilizer/depression    QUEtiapine 400 mg oral tablet  -- 1 tab(s) by mouth once a day (at bedtime) until told to discontinue by MD  -- Indication: For Mood stabilizer/depression    ALPRAZolam 1 mg oral tablet  -- 1 tab(s) by mouth every 6 hours, As needed, anxiety  until told to discontinue by MD MDD:2 tabs  -- Indication: For panic    buPROPion 300 mg/24 hours (XL) oral tablet, extended release  -- 1 tab(s) by mouth once a day until told to discontinue by MD  -- Indication: For Depression I will START or STAY ON the medications listed below when I get home from the hospital:    lamoTRIgine 100 mg oral tablet  -- 1 tab(s) by mouth once a day until told to discontinue by MD  -- Indication: For Mood stabilizer    Lexapro 20 mg oral tablet  -- 1 tab(s) by mouth once a day until told to discontinue by MD  -- Indication: For Depression,     QUEtiapine 100 mg oral tablet  -- 1 tab(s) by mouth once a day until told to discontinue by MD  -- Indication: For Mood stabilizer/depression    QUEtiapine 400 mg oral tablet  -- 1 tab(s) by mouth once a day (at bedtime) until told to discontinue by MD  -- Indication: For Mood stabilizer/depression    ALPRAZolam 1 mg oral tablet  -- 1 tab(s) by mouth every 6 hours, As needed, anxiety  until told to discontinue by MD MDD:2 tabs  -- Indication: For panic    buPROPion 300 mg/24 hours (XL) oral tablet, extended release  -- 1 tab(s) by mouth once a day until told to discontinue by MD  -- Indication: For Depression    vitamin E 400 intl units oral capsule  -- 2 cap(s) by mouth once a day until told to discotninue by MD  -- Indication: For Fatty (change of) liver, not elsewhere classified

## 2018-03-19 NOTE — DISCHARGE NOTE BEHAVIORAL HEALTH - MEDICATION SUMMARY - MEDICATIONS TO STOP TAKING
I will STOP taking the medications listed below when I get home from the hospital:    PROzac    lithium
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
## 2018-03-19 NOTE — DISCHARGE NOTE BEHAVIORAL HEALTH - MEDICATION SUMMARY - MEDICATIONS TO CHANGE
I will SWITCH the dose or number of times a day I take the medications listed below when I get home from the hospital:    LaMICtal

## 2018-03-19 NOTE — PROGRESS NOTE ADULT - SUBJECTIVE AND OBJECTIVE BOX
Patient is a 32y old  Male who presents with a chief complaint of · Reason For Referral  SI  · Patient's Chief Complaint  "The past few months have been particularly stressful. I came to a point where I had a specific suicidal plan." (19 Mar 2018 13:20)      Subective:  No complaints    PAST MEDICAL & SURGICAL HISTORY:      MEDICATIONS  (STANDING):  buPROPion  milliGRAM(s) Oral daily  lamoTRIgine 100 milliGRAM(s) Oral daily  QUEtiapine 100 milliGRAM(s) Oral daily  QUEtiapine 400 milliGRAM(s) Oral at bedtime  vitamin E 800 International Unit(s) Oral daily    MEDICATIONS  (PRN):  acetaminophen   Tablet. 650 milliGRAM(s) Oral every 6 hours PRN Moderate Pain (4 - 6)  ALPRAZolam 1 milliGRAM(s) Oral every 6 hours PRN anxiety  aluminum hydroxide/magnesium hydroxide/simethicone Suspension 30 milliLiter(s) Oral every 6 hours PRN Dyspepsia  diphenhydrAMINE   Injectable 50 milliGRAM(s) IntraMuscular once PRN Agitation  haloperidol    Injectable 5 milliGRAM(s) IntraMuscular once PRN Agitation  hydrOXYzine hydrochloride 50 milliGRAM(s) Oral at bedtime PRN insomnia  LORazepam     Tablet 1 milliGRAM(s) Oral every 8 hours PRN Anxiety      REVIEW OF SYSTEMS:    RESPIRATORY: No shortness of breath  CARDIOVASCULAR: No chest pain  All other review of systems is negative unless indicated above.    Vital Signs Last 24 Hrs  T(C): 36.7 (19 Mar 2018 07:39), Max: 36.7 (19 Mar 2018 07:39)  T(F): 98 (19 Mar 2018 07:39), Max: 98 (19 Mar 2018 07:39)  HR: 85 (19 Mar 2018 07:39) (85 - 85)  BP: 119/72 (19 Mar 2018 07:39) (119/72 - 119/72)  BP(mean): --  RR: 16 (19 Mar 2018 07:39) (16 - 16)  SpO2: 98% (19 Mar 2018 07:39) (98% - 98%)    PHYSICAL EXAM:    Constitutional: NAD, well-developed  Respiratory: CTAB  Cardiovascular: S1 and S2, RRR  Gastrointestinal: BS+, soft, NT/ND  Extremities: No peripheral edema  Psychiatric: Normal mood, normal affect    LABS:                RADIOLOGY & ADDITIONAL STUDIES:

## 2018-03-19 NOTE — DISCHARGE NOTE BEHAVIORAL HEALTH - REASON FOR ADMISSION
· Reason For Referral  SI  · Patient's Chief Complaint  "The past few months have been particularly stressful. I came to a point where I had a specific suicidal plan."

## 2018-03-19 NOTE — DISCHARGE NOTE BEHAVIORAL HEALTH - CONDITIONS AT DISCHARGE
Patient alert, oriented x3, pleasant and cooperative.  Patient denies any thoughts to harm himself or others.  Nurse and  reviewed discharge plan with patient who accepted plan.  A copy of discharge papers were provided to patient.

## 2018-03-19 NOTE — DISCHARGE NOTE BEHAVIORAL HEALTH - NSBHDCCRISISPLAN1FT_PSY_A_CORE
Call 911, go to the nearest emergency room, call my MD or Therapist, call the crisis intervention number provided

## 2018-03-19 NOTE — DISCHARGE NOTE BEHAVIORAL HEALTH - NSBHDCMEDICALFT_PSY_A_CORE
Dr. Hunt came to follow-up elevate LFTs consistent with fatty liver  No cause found Dr. Hunt came to follow-up elevate LFTs consistent with fatty liver  No cause found  Added Vitamin E 8oo Iu daily- follow-up as outpatient 1-2 months

## 2018-03-19 NOTE — DISCHARGE NOTE BEHAVIORAL HEALTH - HPI (INCLUDE ILLNESS QUALITY, SEVERITY, DURATION, TIMING, CONTEXT, MODIFYING FACTORS, ASSOCIATED SIGNS AND SYMPTOMS)
From admit note:  · HPI (include illness quality, severity, duration, timing, context, modifying factors, associated signs and symptoms)  31 y/o , employed  male, domiciled with his  and their 15 months old son, with hx of bipolar d/o, anxiety, borderline personality d/o and one hospitalization in 2013 at [facility], BIB , at the urging ot pt's therapist, due to worsening depressive sx, including SI. Pt is currently in treatment at [facility] and compliant with meds (Wellbutrin, Prozac/Lexapro, lithium, Lamictal). Pt has no hx of suicidal or self harm behavior, no hx of aggression/violence and no hx of substance abuse or legal issues.     On exam pt presents calm, cooperative and open. Reports he's been increasingly stressed and depressed in recent months citing multiple psychosocial stressors (marital discord, financial issues, work issues and the stress of raising a baby). The biggest stressor has been his marriage. He and his  of 5 years (together for 12 years) have been arguing a lot.  In such setting pt has been experiencing suicidal thoughts, specifically thoughts of buying snacks from [employer], checking into to a motel, eat the snacks and overdose on an entire bottle of pills. States the thoughts started in Nov of last year and were pretty vague until this past Friday, when after a particularly bad argument with the , the thoughts "solidified." He did not however act on the thoughts because he was never physically alone this weekend, at all times surrounded by his  or friends.  Also states the intent was ambivalent. Pt does note however that if there had been one more acute stressor at that time, in addition to the argument with , he probably would have gone through with the suicide plan.     Denies current or recent manic sx. Denies current and history of psychotic sx. No substance abuse hx; does not use any illicit substances and drinks about once a month.     Please find below collateral obtained by BT from pt's :  Spoke with patient’s spouse Damon ([phone]) for collateral. Per spouse patient was at baseline consisting of chronic Bipolar disorder, depression, and borderline personality disorder until the past few weeks where patient has been increasingly depressed, having more frequent suicidal thoughts, is more irritable, sleeping less (4hrs/night), not performing chores, easily frustrated and missing multiple days of work a week. Spouse reports patient has not expressed a plan to him but that patient indicated to his psychiatrist and ED staff that he had a plan, states patient has had suicidal plans in the past but no attempts. Spouse reports last week after an argument patient went to the bathroom and superficially cut his arm reporting he initially planned to kill himself but backed out. Spouse states patient went to [employer] during the day and it was recommended spouse take patient to the ED. Patient sees Dr. Perdomo x1/mo and psychologist Dr. Carrera x1/wk at [facility], is currently taking Lithium 300mg x4/day, Prozac 20mg, Lexapro 10mg, and Wellbutrin 300mg. Spouse states patient’s only other admission was around 2013 at [facility] for depression. Spouse reports patient does not have friends or family involved, patient prefers to be alone playing video games and reading. Spouse denies any history of homicidal ideation or violence, no substance abuse, no known past abuse, mother left patient at an early age and patient lived on his own at 19 years old, no access to weapons, family history of mother who had unknown mental health issues.  Spouse does not believe patient is a danger to self or others but is supportive if patient would like to be admitted.

## 2018-03-19 NOTE — DISCHARGE NOTE BEHAVIORAL HEALTH - NSBHDCSUICPROTECTFT_PSY_A_CORE
Responsibility to family and others, Identifies reasons for living, Supportive social network or family, Engaged in work or school

## 2018-03-19 NOTE — PROGRESS NOTE ADULT - ASSESSMENT
Imp:  Fatty liver with elevated LFTs    Rec:  Weight loss  Vitamin E 800 units per day  Outpatient follow up 1-2 months

## 2018-03-19 NOTE — DISCHARGE NOTE BEHAVIORAL HEALTH - NSBHDCMEDSFT_PSY_A_CORE
prozac was tapered and discontinued and Lexapro was titrated to 20 mg  Lithium was tapered and discontinued  Lamictal was increased to 100 mg  Seroquel and Wellbutrin were continued Xanax was added prn for panic attacks he would experience at home    MEDICATIONS  (STANDING):  buPROPion  milliGRAM(s) Oral daily  lamoTRIgine 100 milliGRAM(s) Oral daily  QUEtiapine 100 milliGRAM(s) Oral daily  QUEtiapine 400 milliGRAM(s) Oral at bedtime  ALPRAZolam 1 milliGRAM(s) Oral every 6 hours PRN anxiety    Patient's suicidality resolved quickly he actively participated in unit activities and was forward thinking, looking forward to engagine in outpt tx

## 2018-03-19 NOTE — DISCHARGE NOTE BEHAVIORAL HEALTH - NSBHDCTESTSFT_PSY_A_CORE
Lipid Profile in AM (03.15.18 @ 07:21)    Total Cholesterol/HDL Ratio Measurement: 5.1 RATIO    Cholesterol, Serum: 210 mg/dL    Triglycerides, Serum: 294 mg/dL    HDL Cholesterol, Serum: 41 mg/dL    Direct LDL: 110: LDL Cholesterol --- Interpretive Comment (for adults 18 and over)  Optimal LDL Level may vary based on clinical situation  Below 70                  Ideal for people at very high risk of heart  disease  Below 100                Ideal for people at risk of heart disease  100 - 129                   [place]  130 - 159                   Borderline high  160 - 189                   High  190 and Above          Very high mg/dL  Glucose, Serum (03.15.18 @ 07:21)    Glucose, Serum: 99 mg/dL

## 2018-03-19 NOTE — DISCHARGE NOTE BEHAVIORAL HEALTH - PAST PSYCHIATRIC HISTORY
One inpatient admission in 2013 at [facility] for a similar reason (depression with SI). No hx of suicide attempts or self harm behavior. No hx of aggression/violence.

## 2018-03-19 NOTE — DISCHARGE NOTE BEHAVIORAL HEALTH - NSBHDCSWCOMMENTSFT_PSY_A_CORE
Patient educated to not stop any medication or tx unless otherwise told to do so by outpatient provider

## 2018-03-20 VITALS
RESPIRATION RATE: 16 BRPM | DIASTOLIC BLOOD PRESSURE: 71 MMHG | HEART RATE: 85 BPM | TEMPERATURE: 98 F | SYSTOLIC BLOOD PRESSURE: 125 MMHG | OXYGEN SATURATION: 98 %

## 2018-03-20 PROCEDURE — 93010 ELECTROCARDIOGRAM REPORT: CPT

## 2018-03-20 RX ORDER — VITAMIN E 100 UNIT
2 CAPSULE ORAL
Qty: 0 | Refills: 0 | DISCHARGE
Start: 2018-03-20

## 2018-03-20 RX ADMIN — QUETIAPINE FUMARATE 100 MILLIGRAM(S): 200 TABLET, FILM COATED ORAL at 08:49

## 2018-03-20 RX ADMIN — BUPROPION HYDROCHLORIDE 300 MILLIGRAM(S): 150 TABLET, EXTENDED RELEASE ORAL at 08:49

## 2018-03-20 RX ADMIN — LAMOTRIGINE 100 MILLIGRAM(S): 25 TABLET, ORALLY DISINTEGRATING ORAL at 08:49

## 2018-03-20 RX ADMIN — Medication 800 INTERNATIONAL UNIT(S): at 08:49

## 2018-03-20 RX ADMIN — ESCITALOPRAM OXALATE 20 MILLIGRAM(S): 10 TABLET, FILM COATED ORAL at 08:49

## 2018-03-20 NOTE — PROGRESS NOTE BEHAVIORAL HEALTH - NSBHCHARTREVIEWVS_PSY_A_CORE FT
Vital Signs Last 24 Hrs  T(C): 36.7 (15 Mar 2018 08:33), Max: 36.7 (15 Mar 2018 08:33)  T(F): 98 (15 Mar 2018 08:33), Max: 98 (15 Mar 2018 08:33)  HR: 83 (15 Mar 2018 08:33) (83 - 83)  BP: 128/82 (15 Mar 2018 08:33) (128/82 - 128/82)  BP(mean): --  RR: 14 (15 Mar 2018 08:33) (14 - 14)  SpO2: 98% (15 Mar 2018 08:33) (98% - 98%)
Vital Signs Last 24 Hrs  T(C): 36.4 (20 Mar 2018 07:39), Max: 36.4 (20 Mar 2018 07:39)  T(F): 97.5 (20 Mar 2018 07:39), Max: 97.5 (20 Mar 2018 07:39)  HR: 85 (20 Mar 2018 07:39) (85 - 85)  BP: 125/71 (20 Mar 2018 07:39) (125/71 - 125/71)  BP(mean): --  RR: 16 (20 Mar 2018 07:39) (16 - 16)  SpO2: 98% (20 Mar 2018 07:39) (98% - 98%)
Vital Signs Last 24 Hrs  T(C): 36.6 (17 Mar 2018 08:36), Max: 36.6 (17 Mar 2018 08:36)  T(F): 97.9 (17 Mar 2018 08:36), Max: 97.9 (17 Mar 2018 08:36)  HR: 77 (17 Mar 2018 08:36) (77 - 77)  BP: 124/79 (17 Mar 2018 08:36) (124/79 - 124/79)  BP(mean): --  RR: 14 (17 Mar 2018 08:36) (14 - 14)  SpO2: 98% (17 Mar 2018 08:36) (98% - 98%)
Vital Signs Last 24 Hrs  T(C): 36.7 (16 Mar 2018 07:42), Max: 36.7 (16 Mar 2018 07:42)  T(F): 98 (16 Mar 2018 07:42), Max: 98 (16 Mar 2018 07:42)  HR: 83 (16 Mar 2018 07:42) (83 - 83)  BP: 115/72 (16 Mar 2018 07:42) (115/72 - 115/72)  BP(mean): --  RR: 16 (16 Mar 2018 07:42) (16 - 16)  SpO2: 99% (16 Mar 2018 07:42) (99% - 99%)
Vital Signs Last 24 Hrs  T(C): 37.2 (18 Mar 2018 08:27), Max: 37.2 (18 Mar 2018 08:27)  T(F): 98.9 (18 Mar 2018 08:27), Max: 98.9 (18 Mar 2018 08:27)  HR: 81 (18 Mar 2018 08:27) (81 - 81)  BP: 124/74 (18 Mar 2018 08:27) (124/74 - 124/74)  BP(mean): --  RR: 14 (18 Mar 2018 08:27) (14 - 14)  SpO2: 98% (18 Mar 2018 08:27) (98% - 98%)
Vital Signs Last 24 Hrs  T(C): 36.7 (03-19-18 @ 07:39), Max: 36.7 (03-19-18 @ 07:39)  T(F): 98 (03-19-18 @ 07:39), Max: 98 (03-19-18 @ 07:39)  HR: 85 (03-19-18 @ 07:39) (85 - 85)  BP: 119/72 (03-19-18 @ 07:39) (119/72 - 119/72)  BP(mean): --  RR: 16 (03-19-18 @ 07:39) (16 - 16)  SpO2: 98% (03-19-18 @ 07:39) (98% - 98%)
Vital Signs Last 24 Hrs  T(C): 36.7 (14 Mar 2018 07:44), Max: 36.7 (14 Mar 2018 07:44)  T(F): 98 (14 Mar 2018 07:44), Max: 98 (14 Mar 2018 07:44)  HR: 82 (14 Mar 2018 07:44) (82 - 82)  BP: 135/82 (14 Mar 2018 07:44) (135/82 - 135/82)  BP(mean): --  RR: 14 (14 Mar 2018 07:44) (14 - 14)  SpO2: 98% (14 Mar 2018 07:44) (98% - 98%)

## 2018-03-20 NOTE — PROGRESS NOTE BEHAVIORAL HEALTH - THOUGHT PROCESS
Normal reasoning/Linear
Linear/Normal reasoning
Normal reasoning/Linear
Normal reasoning/Linear
Linear/Normal reasoning
Normal reasoning/Linear
Linear/Normal reasoning

## 2018-03-20 NOTE — PROGRESS NOTE BEHAVIORAL HEALTH - RISK ASSESSMENT
moderate  mood disorder, borderline pd, with formulated plan to harm self
low  risk has been mitigated at this time
moderate  mood disorder, borderline pd, with formulated plan to harm self
moderate  mood disorder, borderline pd, with formulated plan to harm self

## 2018-03-20 NOTE — PROGRESS NOTE BEHAVIORAL HEALTH - ORIENTED TO TIME
Yes
Yes
no fever/no deformity/no abrasion/no bruising/no stiffness/no numbness/no back pain/no difficulty bearing weight
Yes

## 2018-03-20 NOTE — PROGRESS NOTE BEHAVIORAL HEALTH - PROBLEM SELECTOR PLAN 2
Attempt to taper and d/c lithium and prozac  raise lamictal to 75 mg for mood stabilization and depression  Continue Lexapro Wellbutrin and Seroquel
taper and d/c lithium and prozac  raise lamictal to 199 mg for mood stabilization and depression  Continue Lexapro Wellbutrin and Seroquel
Attempt to taper and d/c lithium and prozac  raise lamictal to 75 mg for mood stabilization and depression  Continue Lexapro Wellbutrin and Seroquel
taper and d/c lithium and prozac  raised lamictal to 100 mg for mood stabilization and depression  Continue Lexapro Wellbutrin and Seroquel
Attempt to taper and d/c lithium and prozac  raise lamictal to 75 mg for mood stabilization and depression  Continue Lexapro wellbutrin and Seroquel

## 2018-03-20 NOTE — PROGRESS NOTE BEHAVIORAL HEALTH - NSBHPTASSESSDT_PSY_A_CORE
16-Mar-2018 11:29
17-Mar-2018 10:34
20-Mar-2018 10:23
14-Mar-2018 16:35
18-Mar-2018 12:05
19-Mar-2018 14:19
15-Mar-2018 16:06

## 2018-03-20 NOTE — PROGRESS NOTE BEHAVIORAL HEALTH - NSBHADMITDANGERSELF_PSY_A_CORE
suicidal ideation with plan and means

## 2018-03-20 NOTE — PROGRESS NOTE BEHAVIORAL HEALTH - NSBHADMITIPOBSFT_PSY_A_CORE
no imminent risk on unit as patient voluntarily seeking help

## 2018-03-20 NOTE — PROGRESS NOTE BEHAVIORAL HEALTH - PROBLEM SELECTOR PLAN 1
CBT/DBT, psychoeducation  Family meeting
CBT/DBY, psychoeducation  Family meeting
CBT/DBT, psychoeducation  Family meeting
CBT/DBY, psychoeducation  Family meeting
CBT/DBT, psychoeducation  Family meeting
CBT/DBT, psychoeducation  Family meeting
CBT/DBY, psychoeducation  Family meeting

## 2018-03-20 NOTE — PROGRESS NOTE BEHAVIORAL HEALTH - NSBHFUPINTERVALCCFT_PSY_A_CORE
Patient says he is not suicidal but is unsure how things would go if he were to have an argument with his  once he leaves.  Agreeing to family meeting for next week.
patient reports being eager to go home so he can see his son
patient reports feeling a bit better.  Discussed ongoing med changes and family meeting scheduled for Monday
Patient would like t be informed of all changes in tx plan as he had issues with this during last hospitalization  He remains depressed but is willing to do anything he can to get better.  He loves his son and acknowledges arguments with  may have led to increase in suicidal thoughts
had questions regarding his tx incluidng use of Oneal Huff stimulator for anxiety and depression  Also reports he had been taking testosterone replacement for low T and went off it due to liver concerns Discussed possible connections to low energy and depressed mood
Feeling ready to go if aftercare is in place
Patient reports he is feeeling depressed but not suicidal   Would like family meeting with  and mother in law

## 2018-03-20 NOTE — PROGRESS NOTE BEHAVIORAL HEALTH - SUMMARY
33 yo man with depression irritability and suicidal ideation in context of marital discord.  No discrete episodes elicited and patient is receiving polypharmacy.  Most likely dx Is MDD with comorbid BPD  Will attempt to streamline medication regimen    Consider DBT therapy for borderline PD
33 yo man with depression irritability and suicidal ideation in context of marital discord.  No discrete episodes elicited and patient is receiving polypharmacy.  Most likely dx Is MDD with comorbid BPD  Will attempt to streamline medication regimen
31 yo man with depression irritability and suicidal ideation in context of marital discord.  No discrete episodes elicited and patient is receiving polypharmacy.  Most likely dx Is MDD with comorbid BPD  Will attempt to streamline medication regimen    Consider DBT therapy for borderline PD
33 yo man with depression irritability and suicidal ideation in context of marital discord.  No discrete episodes elicited and patient is receiving polypharmacy.  Most likely dx Is MDD with comorbid BPD  Will attempt to streamline medication regimen    Consider DBT therapy for borderline PD
31 yo man with depression irritability and suicidal ideation in context of marital discord.  No discrete episodes elicited and patient is receiving polypharmacy.  Most likely dx Is MDD with comorbid BPD  Will attempt to streamline medication regimen    Consider DBT therapy for borderline PD
31 yo man with depression irritability and suicidal ideation in context of marital discord.  No discrete episodes elicited and patient is receiving polypharmacy.  Most likely dx Is MDD with comorbid BPD  Will attempt to streamline medication regimen    Consider DBT therapy for borderline PD
31 yo man with depression irritability and suicidal ideation in context of marital discord.  No discrete episodes elicited and patient is receiving polypharmacy.  Most likely dx Is MDD with comorbid BPD  Will attempt to streamline medication regimen

## 2018-03-20 NOTE — PROGRESS NOTE BEHAVIORAL HEALTH - NSBHFUPINTERVALHXFT_PSY_A_CORE
Patent remains dysphoric and anxious but actively working on getting better     Will lower lithium to 300 mg and lower Prozac to 20 mg    MEDICATIONS  (STANDING):  buPROPion  milliGRAM(s) Oral daily  escitalopram 10 milliGRAM(s) Oral daily  FLUoxetine 20 milliGRAM(s) Oral daily  lamoTRIgine 100 milliGRAM(s) Oral daily  lithium 300 milliGRAM(s) Oral at bedtime  QUEtiapine 100 milliGRAM(s) Oral daily  QUEtiapine 400 milliGRAM(s) Oral at bedtime    MEDICATIONS  (PRN):  acetaminophen   Tablet. 650 milliGRAM(s) Oral every 6 hours PRN Moderate Pain (4 - 6)  aluminum hydroxide/magnesium hydroxide/simethicone Suspension 30 milliLiter(s) Oral every 6 hours PRN Dyspepsia  diphenhydrAMINE   Injectable 50 milliGRAM(s) IntraMuscular once PRN Agitation  haloperidol    Injectable 5 milliGRAM(s) IntraMuscular once PRN Agitation  hydrOXYzine hydrochloride 50 milliGRAM(s) Oral at bedtime PRN insomnia  LORazepam     Tablet 1 milliGRAM(s) Oral every 8 hours PRN Anxiety
had productive family meeting yesterday  Will return to [facility] for treatment and consider going elsewhere  later on  No imminent risk of suicide but continues to have tendency to be reactive to stressors.    MEDICATIONS  (STANDING):  buPROPion  milliGRAM(s) Oral daily  escitalopram 20 milliGRAM(s) Oral daily  lamoTRIgine 100 milliGRAM(s) Oral daily  QUEtiapine 100 milliGRAM(s) Oral daily  QUEtiapine 400 milliGRAM(s) Oral at bedtime  vitamin E 800 International Unit(s) Oral daily    MEDICATIONS  (PRN):  acetaminophen   Tablet. 650 milliGRAM(s) Oral every 6 hours PRN Moderate Pain (4 - 6)  ALPRAZolam 1 milliGRAM(s) Oral every 6 hours PRN anxiety  aluminum hydroxide/magnesium hydroxide/simethicone Suspension 30 milliLiter(s) Oral every 6 hours PRN Dyspepsia  diphenhydrAMINE   Injectable 50 milliGRAM(s) IntraMuscular once PRN Agitation  haloperidol    Injectable 5 milliGRAM(s) IntraMuscular once PRN Agitation  hydrOXYzine hydrochloride 50 milliGRAM(s) Oral at bedtime PRN insomnia  LORazepam     Tablet 1 milliGRAM(s) Oral every 8 hours PRN Anxiety
patient is continuing to use the program well and is making progress.  Mood is less depressed.    GI work-up in progress for elevated LFTs    Will continue to streamline medication regimen.
patient adjusting to unit  Agrees to attempt to streamline medication regimen in light of polypharmacy  Dx of bipolar disorder uncertain as happiness has never lasted more than a few hours and also consists of texts that were possibly out of character for him  Symptoms seem more consistent with borderline PD  MEDICATIONS  (STANDING):  buPROPion  milliGRAM(s) Oral daily  FLUoxetine 60 milliGRAM(s) Oral daily  lithium 300 milliGRAM(s) Oral two times a day  QUEtiapine 100 milliGRAM(s) Oral daily  QUEtiapine 400 milliGRAM(s) Oral at bedtime    MEDICATIONS  (PRN):  acetaminophen   Tablet. 650 milliGRAM(s) Oral every 6 hours PRN Moderate Pain (4 - 6)  aluminum hydroxide/magnesium hydroxide/simethicone Suspension 30 milliLiter(s) Oral every 6 hours PRN Dyspepsia  diphenhydrAMINE   Injectable 50 milliGRAM(s) IntraMuscular once PRN Agitation  haloperidol    Injectable 5 milliGRAM(s) IntraMuscular once PRN Agitation  LORazepam     Tablet 1 milliGRAM(s) Oral every 8 hours PRN Anxiety  LORazepam   Injectable 2 milliGRAM(s) IntraMuscular once PRN Agitation  traZODone 50 milliGRAM(s) Oral at bedtime PRN insomnia
Patient continues to improve but has concerns about how things will be when he leaves     MEDICATIONS  (STANDING):  buPROPion  milliGRAM(s) Oral daily  escitalopram 10 milliGRAM(s) Oral daily  FLUoxetine 20 milliGRAM(s) Oral daily  lamoTRIgine 100 milliGRAM(s) Oral daily  lithium 300 milliGRAM(s) Oral at bedtime  QUEtiapine 100 milliGRAM(s) Oral daily  QUEtiapine 400 milliGRAM(s) Oral at bedtime    MEDICATIONS  (PRN):  acetaminophen   Tablet. 650 milliGRAM(s) Oral every 6 hours PRN Moderate Pain (4 - 6)  aluminum hydroxide/magnesium hydroxide/simethicone Suspension 30 milliLiter(s) Oral every 6 hours PRN Dyspepsia  diphenhydrAMINE   Injectable 50 milliGRAM(s) IntraMuscular once PRN Agitation  haloperidol    Injectable 5 milliGRAM(s) IntraMuscular once PRN Agitation  hydrOXYzine hydrochloride 50 milliGRAM(s) Oral at bedtime PRN insomnia  LORazepam     Tablet 1 milliGRAM(s) Oral every 8 hours PRN Anxiety
Met wih patient, mother in law and 
patient adapting to milieu.  Integrating well and trying to use program to its fullest.  Tolerating medication changes Dr. Carrillo consulted regarding elevated liver enzymes likely due to medication.  Lithium taper may help to lower Qtc

## 2018-03-20 NOTE — PROGRESS NOTE BEHAVIORAL HEALTH - PRIMARY DX
Moderate episode of recurrent major depressive disorder

## 2018-03-21 LAB
COPPER ?TM UR-MCNC: 25 — SIGNIFICANT CHANGE UP (ref 15–60)
COPPER UR-MCNC: 950 ML — SIGNIFICANT CHANGE UP

## 2018-04-04 DIAGNOSIS — R45.851 SUICIDAL IDEATIONS: ICD-10-CM

## 2018-04-04 DIAGNOSIS — K80.20 CALCULUS OF GALLBLADDER WITHOUT CHOLECYSTITIS WITHOUT OBSTRUCTION: ICD-10-CM

## 2018-04-04 DIAGNOSIS — I45.81 LONG QT SYNDROME: ICD-10-CM

## 2018-04-04 DIAGNOSIS — K76.0 FATTY (CHANGE OF) LIVER, NOT ELSEWHERE CLASSIFIED: ICD-10-CM

## 2018-04-04 DIAGNOSIS — D56.9 THALASSEMIA, UNSPECIFIED: ICD-10-CM

## 2018-04-04 DIAGNOSIS — F60.3 BORDERLINE PERSONALITY DISORDER: ICD-10-CM

## 2018-04-04 DIAGNOSIS — F31.4 BIPOLAR DISORDER, CURRENT EPISODE DEPRESSED, SEVERE, WITHOUT PSYCHOTIC FEATURES: ICD-10-CM

## 2018-04-04 DIAGNOSIS — G47.33 OBSTRUCTIVE SLEEP APNEA (ADULT) (PEDIATRIC): ICD-10-CM

## 2018-04-04 DIAGNOSIS — F41.9 ANXIETY DISORDER, UNSPECIFIED: ICD-10-CM

## 2018-04-04 DIAGNOSIS — R16.2 HEPATOMEGALY WITH SPLENOMEGALY, NOT ELSEWHERE CLASSIFIED: ICD-10-CM

## 2018-10-25 ENCOUNTER — EMERGENCY (EMERGENCY)
Facility: HOSPITAL | Age: 33
LOS: 0 days | Discharge: HOME | End: 2018-10-25
Attending: EMERGENCY MEDICINE | Admitting: EMERGENCY MEDICINE
Payer: COMMERCIAL

## 2018-10-25 VITALS
SYSTOLIC BLOOD PRESSURE: 133 MMHG | TEMPERATURE: 98 F | OXYGEN SATURATION: 98 % | RESPIRATION RATE: 18 BRPM | DIASTOLIC BLOOD PRESSURE: 84 MMHG | HEART RATE: 89 BPM

## 2018-10-25 VITALS — WEIGHT: 220.02 LBS | HEIGHT: 70 IN

## 2018-10-25 DIAGNOSIS — F60.3 BORDERLINE PERSONALITY DISORDER: ICD-10-CM

## 2018-10-25 DIAGNOSIS — F41.9 ANXIETY DISORDER, UNSPECIFIED: ICD-10-CM

## 2018-10-25 LAB
ALBUMIN SERPL ELPH-MCNC: 4.3 G/DL — SIGNIFICANT CHANGE UP (ref 3.3–5)
ALP SERPL-CCNC: 158 U/L — HIGH (ref 40–120)
ALT FLD-CCNC: 277 U/L — HIGH (ref 12–78)
AMPHET UR-MCNC: NEGATIVE — SIGNIFICANT CHANGE UP
ANION GAP SERPL CALC-SCNC: 10 MMOL/L — SIGNIFICANT CHANGE UP (ref 5–17)
ANISOCYTOSIS BLD QL: SLIGHT — SIGNIFICANT CHANGE UP
APAP SERPL-MCNC: < 2 UG/ML (ref 10–30)
APPEARANCE UR: CLEAR — SIGNIFICANT CHANGE UP
AST SERPL-CCNC: 186 U/L — HIGH (ref 15–37)
BACTERIA # UR AUTO: ABNORMAL
BARBITURATES UR SCN-MCNC: NEGATIVE — SIGNIFICANT CHANGE UP
BASO STIPL BLD QL SMEAR: PRESENT — SIGNIFICANT CHANGE UP
BASOPHILS # BLD AUTO: 0.02 K/UL — SIGNIFICANT CHANGE UP (ref 0–0.2)
BASOPHILS NFR BLD AUTO: 0.2 % — SIGNIFICANT CHANGE UP (ref 0–2)
BENZODIAZ UR-MCNC: NEGATIVE — SIGNIFICANT CHANGE UP
BILIRUB SERPL-MCNC: 0.9 MG/DL — SIGNIFICANT CHANGE UP (ref 0.2–1.2)
BILIRUB UR-MCNC: NEGATIVE — SIGNIFICANT CHANGE UP
BUN SERPL-MCNC: 14 MG/DL — SIGNIFICANT CHANGE UP (ref 7–23)
CALCIUM SERPL-MCNC: 9.9 MG/DL — SIGNIFICANT CHANGE UP (ref 8.5–10.1)
CHLORIDE SERPL-SCNC: 105 MMOL/L — SIGNIFICANT CHANGE UP (ref 96–108)
CO2 SERPL-SCNC: 24 MMOL/L — SIGNIFICANT CHANGE UP (ref 22–31)
COCAINE METAB.OTHER UR-MCNC: NEGATIVE — SIGNIFICANT CHANGE UP
COLOR SPEC: ABNORMAL
CREAT SERPL-MCNC: 0.86 MG/DL — SIGNIFICANT CHANGE UP (ref 0.5–1.3)
DIFF PNL FLD: ABNORMAL
EOSINOPHIL # BLD AUTO: 0.22 K/UL — SIGNIFICANT CHANGE UP (ref 0–0.5)
EOSINOPHIL NFR BLD AUTO: 2.3 % — SIGNIFICANT CHANGE UP (ref 0–6)
EPI CELLS # UR: ABNORMAL
ETHANOL SERPL-MCNC: <10 MG/DL — SIGNIFICANT CHANGE UP (ref 0–10)
GLUCOSE SERPL-MCNC: 106 MG/DL — HIGH (ref 70–99)
GLUCOSE UR QL: NEGATIVE MG/DL — SIGNIFICANT CHANGE UP
HCT VFR BLD CALC: 40.1 % — SIGNIFICANT CHANGE UP (ref 39–50)
HGB BLD-MCNC: 12.5 G/DL — LOW (ref 13–17)
IMM GRANULOCYTES NFR BLD AUTO: 0.5 % — SIGNIFICANT CHANGE UP (ref 0–1.5)
KETONES UR-MCNC: NEGATIVE — SIGNIFICANT CHANGE UP
LEUKOCYTE ESTERASE UR-ACNC: ABNORMAL
LG PLATELETS BLD QL AUTO: SLIGHT — SIGNIFICANT CHANGE UP
LYMPHOCYTES # BLD AUTO: 2.93 K/UL — SIGNIFICANT CHANGE UP (ref 1–3.3)
LYMPHOCYTES # BLD AUTO: 31.1 % — SIGNIFICANT CHANGE UP (ref 13–44)
MANUAL SMEAR VERIFICATION: SIGNIFICANT CHANGE UP
MCHC RBC-ENTMCNC: 20.2 PG — LOW (ref 27–34)
MCHC RBC-ENTMCNC: 31.2 GM/DL — LOW (ref 32–36)
MCV RBC AUTO: 64.8 FL — LOW (ref 80–100)
METHADONE UR-MCNC: NEGATIVE — SIGNIFICANT CHANGE UP
MICROCYTES BLD QL: SIGNIFICANT CHANGE UP
MONOCYTES # BLD AUTO: 0.67 K/UL — SIGNIFICANT CHANGE UP (ref 0–0.9)
MONOCYTES NFR BLD AUTO: 7.1 % — SIGNIFICANT CHANGE UP (ref 2–14)
NEUTROPHILS # BLD AUTO: 5.52 K/UL — SIGNIFICANT CHANGE UP (ref 1.8–7.4)
NEUTROPHILS NFR BLD AUTO: 58.8 % — SIGNIFICANT CHANGE UP (ref 43–77)
NITRITE UR-MCNC: NEGATIVE — SIGNIFICANT CHANGE UP
NRBC # BLD: 0 /100 WBCS — SIGNIFICANT CHANGE UP (ref 0–0)
OPIATES UR-MCNC: NEGATIVE — SIGNIFICANT CHANGE UP
PCP SPEC-MCNC: SIGNIFICANT CHANGE UP
PCP UR-MCNC: NEGATIVE — SIGNIFICANT CHANGE UP
PH UR: 5 — SIGNIFICANT CHANGE UP (ref 5–8)
PLAT MORPH BLD: ABNORMAL
PLATELET # BLD AUTO: 388 K/UL — SIGNIFICANT CHANGE UP (ref 150–400)
PLATELET COUNT - ESTIMATE: NORMAL — SIGNIFICANT CHANGE UP
POIKILOCYTOSIS BLD QL AUTO: SLIGHT — SIGNIFICANT CHANGE UP
POLYCHROMASIA BLD QL SMEAR: SLIGHT — SIGNIFICANT CHANGE UP
POTASSIUM SERPL-MCNC: 4 MMOL/L — SIGNIFICANT CHANGE UP (ref 3.5–5.3)
POTASSIUM SERPL-SCNC: 4 MMOL/L — SIGNIFICANT CHANGE UP (ref 3.5–5.3)
PROT SERPL-MCNC: 8.4 GM/DL — HIGH (ref 6–8.3)
PROT UR-MCNC: 30 MG/DL
RBC # BLD: 6.19 M/UL — HIGH (ref 4.2–5.8)
RBC # FLD: 17 % — HIGH (ref 10.3–14.5)
RBC BLD AUTO: ABNORMAL
RBC CASTS # UR COMP ASSIST: ABNORMAL /HPF (ref 0–4)
SALICYLATES SERPL-MCNC: <1.7 MG/DL — LOW (ref 2.8–20)
SODIUM SERPL-SCNC: 139 MMOL/L — SIGNIFICANT CHANGE UP (ref 135–145)
SP GR SPEC: 1.02 — SIGNIFICANT CHANGE UP (ref 1.01–1.02)
TARGETS BLD QL SMEAR: SLIGHT — SIGNIFICANT CHANGE UP
THC UR QL: NEGATIVE — SIGNIFICANT CHANGE UP
TSH SERPL-MCNC: 1.27 UU/ML — SIGNIFICANT CHANGE UP (ref 0.34–4.82)
UROBILINOGEN FLD QL: 1 MG/DL
WBC # BLD: 9.41 K/UL — SIGNIFICANT CHANGE UP (ref 3.8–10.5)
WBC # FLD AUTO: 9.41 K/UL — SIGNIFICANT CHANGE UP (ref 3.8–10.5)
WBC UR QL: ABNORMAL

## 2018-10-25 PROCEDURE — 93010 ELECTROCARDIOGRAM REPORT: CPT

## 2018-10-25 PROCEDURE — 99283 EMERGENCY DEPT VISIT LOW MDM: CPT

## 2018-10-25 RX ORDER — HYDROXYZINE HCL 10 MG
50 TABLET ORAL ONCE
Qty: 0 | Refills: 0 | Status: COMPLETED | OUTPATIENT
Start: 2018-10-25 | End: 2018-10-25

## 2018-10-25 RX ADMIN — Medication 50 MILLIGRAM(S): at 21:36

## 2018-10-25 NOTE — ED BEHAVIORAL HEALTH ASSESSMENT NOTE - HPI (INCLUDE ILLNESS QUALITY, SEVERITY, DURATION, TIMING, CONTEXT, MODIFYING FACTORS, ASSOCIATED SIGNS AND SYMPTOMS)
Mr. Arnulfo Alvarez is a 32-year old , male, employed at [employer] domiciled with his  and their two year old son in a private residence, Patient has a history of two prior psychiatric hospitalizations, last hospitalization was at  from 3/13/18 - 3/20/18 for depression and prior psychiatric hospitalization was at  in 2013. PPHx includes anxiety, bipolar depression and borderline personality disorder. Today the patient presented to the ED BIB  (Damon [phone]), with c/o worsening intermittent anxiety with panic for the past one month with passive SI today with a vague plan to OD on Draino. He reports symptoms include palpitations, chest tightness, diaphoresis. Patient currently denies any thoughts of suicidal ideation. Pt is currently in treatment at [facility] in [place] and was previously in treatment with Karin Carrera who recently left [facility]. Patient reports he initially had difficultly obtaining and scheduling a treatment appointment to be seen by a new provider at [facility], but today scheduled an appointment to see Monica Perdomo NP at [facility] tomorrow. The patient reports adherence to medication prescribed (Lexapro, Abilify and Lamictal). No known history of SA or self harm behavior, no hx of aggression/violence and no hx of substance abuse or legal issues. Denies access to firearms. Denies current or recent manic sx. Denies current and history of psychotic sx. No substance abuse hx; does not use any illicit substances and drinks about once a month.     Collateral information was obtained from the patient’s spouse Damon ([phone]). Per the spouse, he does not believe patient is a danger to self or others and does not feel the patient requires inpatient psychiatric hospitalization and reports "he was perfectly his normal self when I returned to the home this evening", The spouse reports he is taking off from work tomorrow to stay with the patient and to take him to tomorrow appt at [facility] to meet with the new provider. The spouse denies any history of homicidal ideation or violence, no substance abuse, no known past abuse, mother left patient at an early age and patient lived on his own at 19 years old, no access to weapons, family history of mother who had unknown mental health issues. Mr. Arnulfo Alvarez is a 32-year old , male, employed at [employer], domiciled with his  and their two year old son in a private residence, Patient has a history of two prior psychiatric hospitalizations, last hospitalization was at  from 3/13/18 - 3/20/18 for depression and prior psychiatric hospitalization was at  in 2013. PPHx includes anxiety, bipolar depression and borderline personality disorder. Today the patient presented to the ED BIB  (Damon [phone]), with c/o worsening intermittent anxiety with panic for the past one month with passive SI today with a vague plan to OD on Draino. He reports symptoms include palpitations, chest tightness, diaphoresis. Patient currently denies any thoughts of self-harm or suicide, as well as any thoughts of violence, aggression or homicide.      Pt is currently in treatment at [facility] in [place] and was previously in treatment with Karin Carrera who recently left [facility]. Patient reports he initially had difficultly obtaining and scheduling a treatment appointment to be seen by a new provider at [facility], but today scheduled an appointment to see Monica Perdomo NP at [facility] tomorrow. The patient reports adherence to medication prescribed (Lexapro, Abilify and Lamictal). No known history of SA or self harm behavior, no hx of aggression/violence and no hx of substance abuse or legal issues. Denies access to firearms. Denies current or recent manic sx. Denies current and history of psychotic sx. No substance abuse hx; does not use any illicit substances and drinks about once a month.     Collateral information was obtained from the patient’s spouse Damon ([phone]). Per the spouse, he does not believe patient is a danger to self or others and does not feel the patient requires inpatient psychiatric hospitalization and reports "he was perfectly his normal self when I returned to the home this evening", The spouse reports he is taking off from work tomorrow to stay with the patient and to take him to tomorrow appt at [facility] to meet with the new provider. The spouse denies any history of homicidal ideation or violence, no substance abuse, no known past abuse, mother left patient at an early age and patient lived on his own at 19 years old, no access to weapons, family history of mother who had unknown mental health issues.

## 2018-10-25 NOTE — ED PROVIDER NOTE - PSYCHIATRIC, MLM
Alert and oriented to person, place, time/situation. normal mood and affect. +SI during anxiety attack, resolved now.

## 2018-10-25 NOTE — ED STATDOCS - PROGRESS NOTE DETAILS
Krunal Mark for attending Dr. De Jesus: 33 y/o male with a PMHx of anxiety, bipolar disorder, depression presents to the ED c/o constant anxiety x1 month. Pt notes anxiety is worsening. Pt has had changes in medication. Pt is on Lexapro and Abilify. Pt is not on any medication for anxiety. Pt had panic attack today lasting 6 hours. Pt notes he works but has been missing days and going in late. Pt notes his heart races and has CP when he has an panic attack. Denies SOB. Pt notes he "can't function" when panic attack occurs. Pt notes during panic attack, pt has SI. Outside of panic attack, pt denies SI. Will send pt to main ED for further evaluation.

## 2018-10-25 NOTE — ED BEHAVIORAL HEALTH ASSESSMENT NOTE - NS ED BHA PLAN HOLD IN ED BH CONTACTED FT
Msg left for the therapist, Sydnie Carrera, and psychiatrist, Dr. Monica Perdomo, at [facility] [phone].

## 2018-10-25 NOTE — ED ADULT NURSE NOTE - OBJECTIVE STATEMENT
pt arrives to ED complaining of recent anxiety and anxiety attacks. pt states he has been diagnosed with anxiety and depression in the past and takes daily medications including abilify. pt admits to suicidal ideation "during feelings of anxiety, denies homicidal ideation.  at bedside. denies suicidal plan. belongings with . 1:1 intitiated.

## 2018-10-25 NOTE — ED ADULT NURSE NOTE - NSIMPLEMENTINTERV_GEN_ALL_ED
Implemented All Universal Safety Interventions:  [place] to call system. Call bell, personal items and telephone within reach. Instruct patient to call for assistance. Room bathroom lighting operational. Non-slip footwear when patient is off stretcher. Physically safe environment: no spills, clutter or unnecessary equipment. Stretcher in lowest position, wheels locked, appropriate side rails in place.

## 2018-10-25 NOTE — ED ADULT TRIAGE NOTE - CHIEF COMPLAINT QUOTE
patient c/o worsening anxiety over past few days, becoming hard to control. reports anxiety increasing over past month, notably worse anxiety attacks over past few days. in outpatient treatment at [facility]. patient currently taking lamictal, lexapro, abilify. arrived with friend, calm and cooperative at triage, denies suicidal or homicidal ideations.

## 2018-10-25 NOTE — ED BEHAVIORAL HEALTH ASSESSMENT NOTE - RISK ASSESSMENT
At this time, the patient represents a mild risk of harm to self. His risk factors include history of anxiety and prior psychiatric hospitalizations. However, his protective factors outweigh his risk factors and include his willingness to engage in treatment, support from spouse, stable housing, current employment, demonstration of help seeking behavior, lack of family history of suicide, and current denial of any thoughts of self-harm or suicide, passive or active in nature .No history of any aggressive or violence. Patient is not requesting voluntary psychiatric admission and does not meet criteria for involuntary admission. Not an imminent risk of harm to self or others at this time.

## 2018-10-25 NOTE — ED BEHAVIORAL HEALTH ASSESSMENT NOTE - DESCRIPTION
No Pre-ED Course, patient was BIB his spouse, He was calm and cooperative in the ED, no restraints were required, patient was administered atarax 50 MG po for anxiety. -Heart murmur (785.2, R01.1)  Onset: -GERD without esophagitis (530.81, K21.9)  Onset: 8-July-2016  Status: Resolved-History of bipolar disorder (V11.1, Z86.59)  Onset: 8-July-2016  Status: Resolved-Nonalcoholic fatty liver disease (571.8, K76.0)  Onset: 8-July-2016  Status: Resolved Lives with  and their 2-year old son works for [employer].

## 2018-10-25 NOTE — ED BEHAVIORAL HEALTH ASSESSMENT NOTE - OTHER PAST PSYCHIATRIC HISTORY (INCLUDE DETAILS REGARDING ONSET, COURSE OF ILLNESS, INPATIENT/OUTPATIENT TREATMENT)
2-prior inpatient admissions, last at  from 3/13/18 - 3/20/18 and one prior admission in 2011 No prior SA .Pt is currently in treatment at [facility] in [place] and was previously in treatment with Karin Carrera who recently left [facility]. Patient reports he initially had difficultly obtaining and scheduling a treatment appointment to be seen by a new provider at [facility], but today scheduled an appointment to see Monica Perdomo NP at [facility] tomorrow. The patient reports adherence to medication prescribed (Lexapro, Abilify and Lamictal)

## 2018-10-25 NOTE — ED BEHAVIORAL HEALTH ASSESSMENT NOTE - SUMMARY
Mr. Arnulfo Alvarez is a 32-year old , male, employed at [employer], domiciled with his  and their two year old son in a private residence, Patient has a history of two prior psychiatric hospitalizations, last at  from 3/13/18 - 3/20/18 for depression PPHx includes anxiety, bipolar depression, borderline personality disorder. Today the patient presented to the ED BIB  (Dmaon [phone]), with c/o worsening intermittent anxiety with panic for the past one month with passive SI today with a vague plan to OD on Draino. Patient currently denies any thoughts of suicidal ideation. Pt is currently in treatment at [facility] in [place] with an appointment to see Monica Perdomo NP at [facility] tomorrow. The patient reports adherence to medication prescribed (Lexapro, Abilify and Lamictal). No known history of SA or self harm behavior, no hx of aggression/violence and no hx of substance abuse or legal issues. Denies access to firearms. Denies current or recent manic sx. Denies current and history of psychotic sx. No substance abuse hx; does not use any illicit substances and drinks about once a month.  at this time the patient is Mr. Arnulfo Alvarez is a 32-year old , male, employed at "[employer] domiciled with his  and their two year old son in a private residence, Patient has a history of two prior psychiatric hospitalizations, last at  from 3/13/18 - 3/20/18 for depression PPHx includes anxiety, bipolar depression, borderline personality disorder. Today the patient presented to the ED BIB  (Damon [phone]), with c/o worsening intermittent anxiety with panic for the past one month with passive SI today with a vague plan to OD on Draino. Patient currently denies any thoughts of self-harm or suicide, as well as any thoughts of violence, aggression or homicide.     Pt has an appointment to see Monica Perdomo NP at [facility] tomorrow and reports adherence to medication prescribed (Lexapro, Abilify and Lamictal). No known history of SA or self harm behavior, no hx of aggression/violence and no hx of substance abuse or legal issues. Denies access to firearms. Denies current or recent manic sx. Denies current and history of psychotic sx. No substance abuse hx; does not use any illicit substances and drinks about once a month. Patients spouse stating he does not feel the patient requires inpatient psychiatric hospitalization for safety. Patient is not requesting voluntary psychiatric admission and does not meet criteria for involuntary admission.

## 2018-10-25 NOTE — ED PROVIDER NOTE - OBJECTIVE STATEMENT
33 y/o male with PMHx of anxiety, bipolar disorder, depression presents to the ED c/o worsening anxiety, increased frequency of panic attacks x 1.5 months. Episodes consists of CP, chest tightness, diaphoresis, tremors; all resolved in ED. Reports SI with plan during episode, now resolved. Pt is having difficulty obtaining and scheduling treatment. Denies HI, fever, melena, any other acute sx in ED at this time, No drug use. Nonsmoker. No EtOH. No recent travel.

## 2018-10-25 NOTE — ED BEHAVIORAL HEALTH ASSESSMENT NOTE - PSYCHIATRIC ISSUES AND PLAN (INCLUDE STANDING AND PRN MEDICATION)
Depressed and suicidal with plan. Continue 1:1 while in ED. Continue all home meds (Wellbutrin  mg QAM, lithium 600 mg BID - pending level, Prozac 60 mg QAM and 20 mg QHS, Lexapro 10 mg QAM, Lamictal 50 mg QAM)

## 2018-10-25 NOTE — ED BEHAVIORAL HEALTH ASSESSMENT NOTE - DETAILS
Msg left for the therapist, Sydnie Carrera, and psychiatrist, Dr. Monica Perdomo, at [facility] [phone]. Spoke with ED attending 2 year old son Reports passive SI with a vague plan to use Draino, patient currently denies any thoughts of SI Pts mother has mental health issues, unknown type ED attending

## 2018-10-25 NOTE — ED BEHAVIORAL HEALTH ASSESSMENT NOTE - PATIENT SEEN BY
Attending Psychiatrist without NP/Trainee NP with Telephonic supervision from Attending Psychiatrist

## 2018-10-25 NOTE — ED PROVIDER NOTE - NS_ ATTENDINGSCRIBEDETAILS _ED_A_ED_FT
I Ramirez Chadwick MD saw and examined the patient. Scribe documented for me and under my supervision. I have modified the scribe's documentation where necessary to reflect my history, physical exam and other relevant documentations pertinent to the care of the patient.

## 2018-10-25 NOTE — ED ADULT NURSE NOTE - CHIEF COMPLAINT QUOTE
patient c/o worsening anxiety over past few days, becoming hard to control. reports anxiety increasing over past month, notably worse anxiety attacks over past few days. in outpatient treatment at [facility]. patient currently taking lamictal, lexapro, abilify. arrived with friend, calm and cooperative at triage, denies suicidal or homicidal ideations.

## 2018-10-25 NOTE — ED BEHAVIORAL HEALTH ASSESSMENT NOTE - OTHER
Marital discord n/a Patient reports he initially had difficultly obtaining and scheduling a treatment appointment to be seen by a new provider at [facility]

## 2018-10-25 NOTE — ED PROVIDER NOTE - PROGRESS NOTE DETAILS
Results of labs including abnormal liver findings discussed with patient and patient to follow up outpatient and aware of the findings.

## 2018-10-25 NOTE — ED BEHAVIORAL HEALTH ASSESSMENT NOTE - SUICIDE PROTECTIVE FACTORS
Responsibility to family and others/Identifies reasons for living/Supportive social network or family/Engaged in work or school

## 2018-10-26 DIAGNOSIS — F41.9 ANXIETY DISORDER, UNSPECIFIED: ICD-10-CM

## 2018-10-26 PROBLEM — F31.9 BIPOLAR DISORDER, UNSPECIFIED: Chronic | Status: ACTIVE | Noted: 2018-03-24

## 2018-10-26 PROBLEM — F32.9 MAJOR DEPRESSIVE DISORDER, SINGLE EPISODE, UNSPECIFIED: Chronic | Status: ACTIVE | Noted: 2018-03-24

## 2018-10-27 LAB — LAMOTRIGINE SERPL-MCNC: 3.3 MCG/ML — SIGNIFICANT CHANGE UP (ref 2.5–15)

## 2018-12-21 ENCOUNTER — APPOINTMENT (OUTPATIENT)
Dept: NEUROLOGY | Facility: CLINIC | Age: 33
End: 2018-12-21
Payer: COMMERCIAL

## 2018-12-21 VITALS
BODY MASS INDEX: 30.92 KG/M2 | HEIGHT: 70 IN | HEART RATE: 86 BPM | WEIGHT: 216 LBS | DIASTOLIC BLOOD PRESSURE: 50 MMHG | SYSTOLIC BLOOD PRESSURE: 110 MMHG

## 2018-12-21 DIAGNOSIS — F32.9 MAJOR DEPRESSIVE DISORDER, SINGLE EPISODE, UNSPECIFIED: ICD-10-CM

## 2018-12-21 PROCEDURE — 99244 OFF/OP CNSLTJ NEW/EST MOD 40: CPT

## 2018-12-21 RX ORDER — LAMOTRIGINE 200 MG/1
200 TABLET ORAL
Refills: 0 | Status: ACTIVE | COMMUNITY

## 2018-12-21 RX ORDER — ESCITALOPRAM OXALATE 20 MG/1
20 TABLET, FILM COATED ORAL
Refills: 0 | Status: ACTIVE | COMMUNITY

## 2019-01-07 ENCOUNTER — OTHER (OUTPATIENT)
Age: 34
End: 2019-01-07

## 2019-01-23 ENCOUNTER — TRANSCRIPTION ENCOUNTER (OUTPATIENT)
Age: 34
End: 2019-01-23

## 2019-01-23 ENCOUNTER — APPOINTMENT (OUTPATIENT)
Dept: NEUROLOGY | Facility: CLINIC | Age: 34
End: 2019-01-23
Payer: COMMERCIAL

## 2019-01-23 VITALS
BODY MASS INDEX: 29.78 KG/M2 | DIASTOLIC BLOOD PRESSURE: 70 MMHG | WEIGHT: 208 LBS | HEART RATE: 94 BPM | HEIGHT: 70 IN | SYSTOLIC BLOOD PRESSURE: 112 MMHG

## 2019-01-23 PROCEDURE — 99213 OFFICE O/P EST LOW 20 MIN: CPT

## 2019-01-23 NOTE — PHYSICAL EXAM
[FreeTextEntry1] : Examination:\par Constitutional: normal, no apparent distress\par Eyes: normal conjunctiva b/l, no ptosis, visual fields full\par Respiratory: no respiratory distress, normal effort, normal auscultation\par Cardiovascular: normal rate, rhythm, no murmurs\par Neck: supple, no masses\par Vascular: carotids normal\par Skin: normal color, no rashes\par Psych: normal mood, affect\par \par Neurological:\par Memory: normal memory, oriented to person, place, time\par Language intact/no aphasia\par Cranial Nerves: II-XII intact, Pupils equally round and reactive to light, ocular muscles/movements intact, no ptosis, no facial weakness, tongue protrudes normally in the midline, \par Motor: normal tone, no pronator drift, full strength in all four extremities in the proximal and distal muscle groups\par Coordination: Fine motor movements intact, rapid alternating movements intact, finger to nose intact bilaterally\par Sensory: intact to light touch\par DTRs: symmetric, normal\par Gait: narrow based, steady\par

## 2019-01-23 NOTE — HISTORY OF PRESENT ILLNESS
[FreeTextEntry1] : I last saw him on 12/21/18.\par He does feel that modafinil is somewhat helpful. He has tried taking 100 mg BID and 200 mg at one time. He does not see any improvement in his level of sleepiness when he takes 200 mg at once.\par He finds that he no longer feels an irresistible urge to sleep but he can still fall asleep at any time. \par He can make it through his work day without having to take a nap during lunch. \par He has not noticed any side effects including headaches.\par He denies having any palpitations.\par \par He typically spends 8 hours in bed and sleeps for 6-7 of those 8 hours.\par \par There is no history of dream enactment behavior, hypnagogic/hypnopompic hallucinations, sleep paralysis or cataplexy.\par \par His [place] Sleepiness Scale Score today is 15/24 with Provigil (compared to 19/24 prior to starting Provigil).\par \par He is using his CPAP machine nightly without any problems.

## 2019-01-23 NOTE — DISCUSSION/SUMMARY
[FreeTextEntry1] : Mr. Alvarez is a 32 year old man with a history of obstructive sleep apnea and excessive daytime sleepiness.\par \par 1. PRASANNA - The patient is compliant with PAP. He feels a clinical benefit from PAP use and will continue to use PAP on a nightly basis.\par I have advised him to try to increase his sleep time to 7-8 hours per night.\par \par 2. Hypersomnia - He has hypersomnia despite adequately treated PRASANNA. It is unclear if he has Narcolepsy but he has no features other than excessive daytime sleepiness.\par He has had some improvement in his [place] Sleepiness Scale Score with Provigil.\par Increase Provigil to 200 mg BID prn.\par If not effective, will switch to a traditional stimulant.\par His heart rate today was 94. I will keep an eye on this to make sure that he does not develop tachycardia with increased doses of Provigil.\par He was reminded not to drive while sleepy.\par \par Follow-up in one month, sooner if needed. \par

## 2019-02-20 ENCOUNTER — APPOINTMENT (OUTPATIENT)
Dept: NEUROLOGY | Facility: CLINIC | Age: 34
End: 2019-02-20
Payer: COMMERCIAL

## 2019-02-20 VITALS
SYSTOLIC BLOOD PRESSURE: 118 MMHG | HEART RATE: 84 BPM | HEIGHT: 70 IN | WEIGHT: 206 LBS | DIASTOLIC BLOOD PRESSURE: 68 MMHG | BODY MASS INDEX: 29.49 KG/M2

## 2019-02-20 PROCEDURE — 99213 OFFICE O/P EST LOW 20 MIN: CPT

## 2019-02-20 RX ORDER — ARMODAFINIL 150 MG/1
150 TABLET ORAL
Qty: 30 | Refills: 1 | Status: DISCONTINUED | COMMUNITY
Start: 2018-12-21 | End: 2019-02-20

## 2019-02-20 NOTE — HISTORY OF PRESENT ILLNESS
[FreeTextEntry1] : I last saw Mr. Alvarez on 1/23/18.\par He is now taking 400 mg of modafinil. He is taking all 400 mg at once.\par If he takes it later than 8 AM he does not find it to be effective.\par He does not take it every day because he likes to see if there is a difference.\par He finds that it prevents him from falling asleep but he still feels very tired and has difficulty focusing and functioning. \par \par He is sleeping for 7-8 hours per night with his CPAP machine.\par His  rarely notices snoring with CPAP use. \par \par There is no history of dream enactment behavior, hypnagogic/hypnopompic hallucinations, sleep paralysis or cataplexy.\par \par His [place] Sleepiness Scale Score is 18/24.\par

## 2019-02-20 NOTE — DISCUSSION/SUMMARY
[FreeTextEntry1] : 1. PRASANNA - The patient is compliant with PAP. He feels a clinical benefit from PAP use and will continue to use PAP on a nightly basis.\par Continue nightly use of CPAP for at least 7-8 hours per night. He will monitor his AHI on his CPAP stef.\par \par 2. Hypersomnia - He has hypersomnia despite adequately treated PRASANNA. It is unclear if he has Narcolepsy but he has no features other than excessive daytime sleepiness.\par An increased dose of Provigil to 400 mg/day has not provided significant benefit.\par Discontinue Provigil and start Adderall XR 20 mg/day.\par We discussed some common side effects of stimulants including appetite suppression, anxiety, palpitations, insomnia. He has a history of palpitations and will monitor this closely.\par If Adderall XR 20 mg is not effective, the dose can be increased to 30 mg/day.\par If this is still  not effective I will consider PSG/MSLT to evaluate for possible Narcolepsy.\par \par He was reminded not to drive while sleepy.\par \par He will call me to let me know how well the Adderall is working.\par \par Follow-up in two months, sooner if needed.

## 2019-02-20 NOTE — CONSULT LETTER
[Dear  ___] : Dear  [unfilled], [Consult Letter:] : I had the pleasure of evaluating your patient, [unfilled]. [Please see my note below.] : Please see my note below. [Consult Closing:] : Thank you very much for allowing me to participate in the care of this patient.  If you have any questions, please do not hesitate to contact me. [FreeTextEntry2] : Aurelio Rhodes [FreeTextEntry3] : Sincerely,\par \par \par Chikis Chase MD\par Diplomate, American Academy of Psychiatry and Neurology\par Board Certified in the Subspecialty of Clinical Neurophysiology\par Board Certified in the Subspecialty of Sleep Medicine\par Board Certified in the Subspecialty of Epilepsy\par

## 2019-03-31 ENCOUNTER — EMERGENCY (EMERGENCY)
Facility: HOSPITAL | Age: 34
LOS: 0 days | Discharge: HOME | End: 2019-03-31
Attending: STUDENT IN AN ORGANIZED HEALTH CARE EDUCATION/TRAINING PROGRAM | Admitting: STUDENT IN AN ORGANIZED HEALTH CARE EDUCATION/TRAINING PROGRAM
Payer: COMMERCIAL

## 2019-03-31 VITALS — HEIGHT: 70 IN | WEIGHT: 199.96 LBS

## 2019-03-31 VITALS
TEMPERATURE: 98 F | SYSTOLIC BLOOD PRESSURE: 132 MMHG | HEART RATE: 72 BPM | OXYGEN SATURATION: 99 % | DIASTOLIC BLOOD PRESSURE: 72 MMHG | RESPIRATION RATE: 17 BRPM

## 2019-03-31 DIAGNOSIS — K42.9 UMBILICAL HERNIA WITHOUT OBSTRUCTION OR GANGRENE: ICD-10-CM

## 2019-03-31 DIAGNOSIS — F31.9 BIPOLAR DISORDER, UNSPECIFIED: ICD-10-CM

## 2019-03-31 DIAGNOSIS — Z87.19 PERSONAL HISTORY OF OTHER DISEASES OF THE DIGESTIVE SYSTEM: ICD-10-CM

## 2019-03-31 DIAGNOSIS — R10.9 UNSPECIFIED ABDOMINAL PAIN: ICD-10-CM

## 2019-03-31 DIAGNOSIS — F41.9 ANXIETY DISORDER, UNSPECIFIED: ICD-10-CM

## 2019-03-31 DIAGNOSIS — F32.9 MAJOR DEPRESSIVE DISORDER, SINGLE EPISODE, UNSPECIFIED: ICD-10-CM

## 2019-03-31 LAB
ALBUMIN SERPL ELPH-MCNC: 4.6 G/DL — SIGNIFICANT CHANGE UP (ref 3.3–5)
ALP SERPL-CCNC: 175 U/L — HIGH (ref 40–120)
ALT FLD-CCNC: 158 U/L — HIGH (ref 12–78)
ANION GAP SERPL CALC-SCNC: 8 MMOL/L — SIGNIFICANT CHANGE UP (ref 5–17)
ANISOCYTOSIS BLD QL: SLIGHT — SIGNIFICANT CHANGE UP
APPEARANCE UR: CLEAR — SIGNIFICANT CHANGE UP
AST SERPL-CCNC: 75 U/L — HIGH (ref 15–37)
BACTERIA # UR AUTO: ABNORMAL
BASOPHILS # BLD AUTO: 0.03 K/UL — SIGNIFICANT CHANGE UP (ref 0–0.2)
BASOPHILS NFR BLD AUTO: 0.3 % — SIGNIFICANT CHANGE UP (ref 0–2)
BILIRUB SERPL-MCNC: 1 MG/DL — SIGNIFICANT CHANGE UP (ref 0.2–1.2)
BILIRUB UR-MCNC: NEGATIVE — SIGNIFICANT CHANGE UP
BUN SERPL-MCNC: 13 MG/DL — SIGNIFICANT CHANGE UP (ref 7–23)
CALCIUM SERPL-MCNC: 9.8 MG/DL — SIGNIFICANT CHANGE UP (ref 8.5–10.1)
CHLORIDE SERPL-SCNC: 104 MMOL/L — SIGNIFICANT CHANGE UP (ref 96–108)
CO2 SERPL-SCNC: 28 MMOL/L — SIGNIFICANT CHANGE UP (ref 22–31)
COLOR SPEC: YELLOW — SIGNIFICANT CHANGE UP
CREAT SERPL-MCNC: 0.9 MG/DL — SIGNIFICANT CHANGE UP (ref 0.5–1.3)
DIFF PNL FLD: NEGATIVE — SIGNIFICANT CHANGE UP
EOSINOPHIL # BLD AUTO: 0.16 K/UL — SIGNIFICANT CHANGE UP (ref 0–0.5)
EOSINOPHIL NFR BLD AUTO: 1.6 % — SIGNIFICANT CHANGE UP (ref 0–6)
EPI CELLS # UR: SIGNIFICANT CHANGE UP
GLUCOSE SERPL-MCNC: 84 MG/DL — SIGNIFICANT CHANGE UP (ref 70–99)
GLUCOSE UR QL: NEGATIVE MG/DL — SIGNIFICANT CHANGE UP
HCT VFR BLD CALC: 40.7 % — SIGNIFICANT CHANGE UP (ref 39–50)
HGB BLD-MCNC: 12.3 G/DL — LOW (ref 13–17)
HYPOCHROMIA BLD QL: SLIGHT — SIGNIFICANT CHANGE UP
IMM GRANULOCYTES NFR BLD AUTO: 0.4 % — SIGNIFICANT CHANGE UP (ref 0–1.5)
KETONES UR-MCNC: NEGATIVE — SIGNIFICANT CHANGE UP
LEUKOCYTE ESTERASE UR-ACNC: ABNORMAL
LIDOCAIN IGE QN: 123 U/L — SIGNIFICANT CHANGE UP (ref 73–393)
LYMPHOCYTES # BLD AUTO: 3.29 K/UL — SIGNIFICANT CHANGE UP (ref 1–3.3)
LYMPHOCYTES # BLD AUTO: 33.5 % — SIGNIFICANT CHANGE UP (ref 13–44)
MACROCYTES BLD QL: SLIGHT — SIGNIFICANT CHANGE UP
MANUAL SMEAR VERIFICATION: SIGNIFICANT CHANGE UP
MCHC RBC-ENTMCNC: 20.2 PG — LOW (ref 27–34)
MCHC RBC-ENTMCNC: 30.2 GM/DL — LOW (ref 32–36)
MCV RBC AUTO: 66.8 FL — LOW (ref 80–100)
MICROCYTES BLD QL: SIGNIFICANT CHANGE UP
MONOCYTES # BLD AUTO: 0.79 K/UL — SIGNIFICANT CHANGE UP (ref 0–0.9)
MONOCYTES NFR BLD AUTO: 8 % — SIGNIFICANT CHANGE UP (ref 2–14)
NEUTROPHILS # BLD AUTO: 5.52 K/UL — SIGNIFICANT CHANGE UP (ref 1.8–7.4)
NEUTROPHILS NFR BLD AUTO: 56.2 % — SIGNIFICANT CHANGE UP (ref 43–77)
NITRITE UR-MCNC: NEGATIVE — SIGNIFICANT CHANGE UP
NRBC # BLD: 0 /100 WBCS — SIGNIFICANT CHANGE UP (ref 0–0)
PH UR: 6.5 — SIGNIFICANT CHANGE UP (ref 5–8)
PLAT MORPH BLD: NORMAL — SIGNIFICANT CHANGE UP
PLATELET # BLD AUTO: 420 K/UL — HIGH (ref 150–400)
POIKILOCYTOSIS BLD QL AUTO: SLIGHT — SIGNIFICANT CHANGE UP
POTASSIUM SERPL-MCNC: 3.7 MMOL/L — SIGNIFICANT CHANGE UP (ref 3.5–5.3)
POTASSIUM SERPL-SCNC: 3.7 MMOL/L — SIGNIFICANT CHANGE UP (ref 3.5–5.3)
PROT SERPL-MCNC: 8.6 GM/DL — HIGH (ref 6–8.3)
PROT UR-MCNC: NEGATIVE MG/DL — SIGNIFICANT CHANGE UP
RBC # BLD: 6.09 M/UL — HIGH (ref 4.2–5.8)
RBC # FLD: 16.2 % — HIGH (ref 10.3–14.5)
RBC BLD AUTO: ABNORMAL
RBC CASTS # UR COMP ASSIST: SIGNIFICANT CHANGE UP /HPF (ref 0–4)
SODIUM SERPL-SCNC: 140 MMOL/L — SIGNIFICANT CHANGE UP (ref 135–145)
SP GR SPEC: 1.01 — SIGNIFICANT CHANGE UP (ref 1.01–1.02)
UROBILINOGEN FLD QL: NEGATIVE MG/DL — SIGNIFICANT CHANGE UP
WBC # BLD: 9.83 K/UL — SIGNIFICANT CHANGE UP (ref 3.8–10.5)
WBC # FLD AUTO: 9.83 K/UL — SIGNIFICANT CHANGE UP (ref 3.8–10.5)
WBC UR QL: SIGNIFICANT CHANGE UP

## 2019-03-31 PROCEDURE — 74177 CT ABD & PELVIS W/CONTRAST: CPT | Mod: 26

## 2019-03-31 PROCEDURE — 99284 EMERGENCY DEPT VISIT MOD MDM: CPT

## 2019-03-31 NOTE — ED ADULT NURSE NOTE - CHPI ED NUR SYMPTOMS NEG
no chills/no abdominal distension/no blood in stool/no burning urination/no nausea/no vomiting/no diarrhea/no fever/no dysuria/no hematuria

## 2019-03-31 NOTE — ED STATDOCS - PROGRESS NOTE DETAILS
33 yr. old male PMH: Hiatal Hernia, Anxiety, Depression presents to ED with painful Umbilical hernia onset yesterday. Seen and examined by attending in intake. Plan: IV, Labs, CT abd./pelvis. Will F/U with results and re evaluate. Aimee HAILE Reviewed CT abd./pelvis results with Dr. Shukla. Follow up with surgeon on out patient basis. Aimee HAILE Referred to PMD  and given results of Lab work with elevated LFT's. Instructions to apply  Ice compresses to affected umbilical hernia.

## 2019-03-31 NOTE — ED STATDOCS - OBJECTIVE STATEMENT
34 y/o male with a PMHx of hiatal hernia, anxiety, depression presents to the ED c/o umbilical hernia. Pt notes he was told to come to ED if he had pain. +pain since yesterday. On Lexapro, Adderall. No other complaints at this time.

## 2019-03-31 NOTE — ED STATDOCS - ATTENDING CONTRIBUTION TO CARE
I, Roseline Slater DO,  performed the initial face to face bedside interview with this patient regarding history of present illness, review of symptoms and relevant past medical, social and family history.  I completed an independent physical examination.  I was the initial provider who evaluated this patient. I have signed out the follow up of any pending tests (i.e. labs, radiological studies) to the ACP.  I have communicated the patient’s plan of care and disposition with the ACP.  The history, relevant review of systems, past medical and surgical history, medical decision making, and physical examination was documented by the scribe in my presence and I attest to the accuracy of the documentation.

## 2019-04-01 ENCOUNTER — OTHER (OUTPATIENT)
Age: 34
End: 2019-04-01

## 2019-04-01 ENCOUNTER — TRANSCRIPTION ENCOUNTER (OUTPATIENT)
Age: 34
End: 2019-04-01

## 2019-04-01 LAB
CULTURE RESULTS: NO GROWTH — SIGNIFICANT CHANGE UP
SPECIMEN SOURCE: SIGNIFICANT CHANGE UP

## 2019-04-02 PROBLEM — K44.9 DIAPHRAGMATIC HERNIA WITHOUT OBSTRUCTION OR GANGRENE: Chronic | Status: ACTIVE | Noted: 2019-03-31

## 2019-04-08 ENCOUNTER — APPOINTMENT (OUTPATIENT)
Dept: SURGERY | Facility: CLINIC | Age: 34
End: 2019-04-08
Payer: COMMERCIAL

## 2019-04-08 VITALS
HEART RATE: 73 BPM | HEIGHT: 70 IN | WEIGHT: 206 LBS | RESPIRATION RATE: 16 BRPM | BODY MASS INDEX: 29.49 KG/M2 | TEMPERATURE: 98.5 F | SYSTOLIC BLOOD PRESSURE: 119 MMHG | DIASTOLIC BLOOD PRESSURE: 83 MMHG

## 2019-04-08 DIAGNOSIS — K43.9 VENTRAL HERNIA W/OUT OBSTRUCTION OR GANGRENE: ICD-10-CM

## 2019-04-08 PROCEDURE — 99202 OFFICE O/P NEW SF 15 MIN: CPT

## 2019-04-08 NOTE — PHYSICAL EXAM
[JVD] : no jugular venous distention  [Normal Breath Sounds] : Normal breath sounds [Normal Heart Sounds] : normal heart sounds [No Rash or Lesion] : No rash or lesion [Alert] : alert [Oriented to Person] : oriented to person [Oriented to Place] : oriented to place [Oriented to Time] : oriented to time [de-identified] : no distress [de-identified] : YVONNE TA, Simonanl [de-identified] : no tracheal deviation [de-identified] : CTAB [de-identified] : S1S2+ [de-identified] : bowel sounds (+), soft, nontender, non distended, no rebound, no gaurding, no rigidity, no skin changes to exam. (+) reducible non tender periumbilical ventral hernia, no skin changes to exam [de-identified] : normal affect

## 2019-04-08 NOTE — ASSESSMENT
[FreeTextEntry1] : A/P:\par Non incarcerated non strangulated periumbilical ventral hernia\par Pt advised to seek immediate medical attention for signs of incarceration/strangulation of hernia explained and understood in lay terms or any adverse changes to health\par Medical comorbidities of GERD, psychiatric issues, sleep apnea on cpap, hiatal hernia\par Pt will need medical clearance for surgical intervention\par \par Pt seen and examined at bedside. Pt is AAOx3, pt in no acute distress. Pt has non incarcerated non strangulated periumbilical ventral hernia. Pt explained the surgical procedures in both medical terminology and in lay terms that the patient understood, ventral hernia repairpossible mesh placement, possible exploratory laparotomy. Pt explained in both medical terminology and in lay terms that the patient understood, the benefits and alternatives of surgery including non-operative management. Pt explained at length in both medical terminology and in lay terms that the patient understood, the associated risks of surgery including but not limited to infection, bleeding, nerve/organ injury, postoperative pain, poor wound healing, scar formation both internally and externally, risk of seroma/hematoma/abcess formation, risk of hernia development/recurrence, risk of mesh infection necessitating surgical removal of mesh, risk of need for further GI/IR/Surgery intervention as required. Pt understood all of the above. All questions were answered. Pt gave informed consent for surgery.\par \par Pt to be scheduled for surgical intervention accordingly

## 2019-04-08 NOTE — HISTORY OF PRESENT ILLNESS
[de-identified] : Pt seen and examined at bedside with chaperone. Pt is AAOx3, pt in no acute distress. Pt has c/o periumbilical ventral hernia with discomfort, intermittent. Pt denied c/o fever, chills, chest pain, SOB, N/V/D, extremity pain or dysfunction, hemoptysis, hematemesis, hematuria, hematochexia, headache, diplopia, vertigo, dizzyness. Pt tolerating diet, (+) void, (+) ambulation, (+) bowel function [de-identified] : PMH: sleep apnea, psychiatric issues (pt declined to elaborate), GERD, hiatal hernia\par PSH: hiatal hernia repair\par Allergies: NKDA\par SH: denied etoh, tobacco, illicit drug use\par FH: denied significant history per pt

## 2019-04-22 ENCOUNTER — APPOINTMENT (OUTPATIENT)
Dept: NEUROLOGY | Facility: CLINIC | Age: 34
End: 2019-04-22
Payer: COMMERCIAL

## 2019-04-22 VITALS
HEIGHT: 70 IN | HEART RATE: 98 BPM | SYSTOLIC BLOOD PRESSURE: 122 MMHG | BODY MASS INDEX: 29.35 KG/M2 | DIASTOLIC BLOOD PRESSURE: 78 MMHG | WEIGHT: 205 LBS

## 2019-04-22 PROCEDURE — 99213 OFFICE O/P EST LOW 20 MIN: CPT

## 2019-04-22 RX ORDER — MODAFINIL 200 MG/1
200 TABLET ORAL
Qty: 60 | Refills: 2 | Status: DISCONTINUED | COMMUNITY
Start: 2019-01-23 | End: 2019-04-22

## 2019-04-22 RX ORDER — MODAFINIL 100 MG/1
100 TABLET ORAL
Qty: 30 | Refills: 1 | Status: DISCONTINUED | COMMUNITY
Start: 2019-01-04 | End: 2019-04-22

## 2019-04-22 NOTE — CONSULT LETTER
[Dear  ___] : Dear  [unfilled], [Please see my note below.] : Please see my note below. [Consult Letter:] : I had the pleasure of evaluating your patient, [unfilled]. [Consult Closing:] : Thank you very much for allowing me to participate in the care of this patient.  If you have any questions, please do not hesitate to contact me. [FreeTextEntry2] : Aurelio Rhodes [FreeTextEntry3] : Sincerely,\par \par \par Chikis Chase MD\par Diplomate, American Academy of Psychiatry and Neurology\par Board Certified in the Subspecialty of Clinical Neurophysiology\par Board Certified in the Subspecialty of Sleep Medicine\par Board Certified in the Subspecialty of Epilepsy\par

## 2019-04-22 NOTE — DISCUSSION/SUMMARY
[FreeTextEntry1] : 1. PRASANNA - The patient is compliant with PAP. He feels a clinical benefit from PAP use and will continue to use PAP on a nightly basis.\par Continue nightly use of CPAP for at least 7-8 hours per night. \par AHI has been 2.5 or less. He does possibly have some aerophobia. If not already on a c-flex setting of 3, he will make this adjustment on his machine to somewhat mimic BPAP. If he is already on c-flex, I will try to get approval for a BPAP machine. The DME company that provided his CPAP machine is [employer].  He will forward a copy of his previous sleep study. \par \par 2. Hypersomnia - He has hypersomnia despite adequately treated PRASANNA. It is unclear if he has Narcolepsy but he has no features other than excessive daytime sleepiness.\par An increased dose of Provigil to 400 mg/day did not provided significant benefit.\par Adderall XR 20 mg is somewhat effective although it is taking too long to become effective.\par Increase Adderall XR to 30 mg/day and also start 10 mg of immediate release in the morning. \par He should take this on an empty stomach. \par Monitor heart rate which is near the upper limits of normal.\par Monitor for side effects.

## 2019-04-22 NOTE — PHYSICAL EXAM
[FreeTextEntry1] : Examination:\par Constitutional: normal, no apparent distress\par Eyes: normal conjunctiva b/l, no ptosis, visual fields full\par oropharynx: narrow with enlarged tonsils\par Respiratory: no respiratory distress, normal effort, normal auscultation\par Cardiovascular: normal rate, rhythm, no murmurs\par Neck: supple, no masses\par Vascular: carotids normal\par Skin: normal color, no rashes\par Psych: normal mood, affect\par \par Neurological:\par Memory: normal memory, oriented to person, place, time\par Language intact/no aphasia\par Cranial Nerves: II-XII intact, Pupils equally round and reactive to light, ocular muscles/movements intact, no ptosis, no facial weakness, tongue protrudes normally in the midline, \par Motor: normal tone, no pronator drift, full strength in all four extremities in the proximal and distal muscle groups\par Coordination: Fine motor movements intact, rapid alternating movements intact, finger to nose intact bilaterally\par Sensory: intact to light touch\par DTRs: symmetric, normal\par Gait: narrow based, steady\par

## 2019-04-22 NOTE — HISTORY OF PRESENT ILLNESS
[FreeTextEntry1] : I last saw Mr. Alvarez on 2/20/19.\par He finds the Adderall to be more effective than modafinil. He finds the 20 mg to be "sufficient" but he is still sleepy. On rare occasions he took two pills and found that with 40 mg he was "awake". \par He does find that it takes a few hours to become effective, even when he takes it on an empty stomach. He takes it at about 7 AM and he does not feel an effect until about 10 AM. \par The effect does seem to last until about 7-8 PM.\par He is not have any difficulty falling asleep, anxiety or insomnia. He had some appetite suppression initially. \par \par He is having surgery for a hernia in a few weeks.\par His surgeon told him that he should be on a BIPAP as it would be more beneficial for his history of GERD and history of hiatal hernia. He is going for repair of an umbilical hernia. \par He does feel "gassy" when he first wakes up. \par He still feels tired when he wakes up in the morning despite his AHI being only 2.5 or lower. \par

## 2019-04-24 ENCOUNTER — OTHER (OUTPATIENT)
Age: 34
End: 2019-04-24

## 2019-05-08 ENCOUNTER — OUTPATIENT (OUTPATIENT)
Dept: OUTPATIENT SERVICES | Facility: HOSPITAL | Age: 34
LOS: 1 days | Discharge: HOME | End: 2019-05-08

## 2019-05-08 VITALS
SYSTOLIC BLOOD PRESSURE: 127 MMHG | RESPIRATION RATE: 16 BRPM | HEART RATE: 81 BPM | DIASTOLIC BLOOD PRESSURE: 81 MMHG | OXYGEN SATURATION: 99 % | WEIGHT: 197.98 LBS | TEMPERATURE: 98 F | HEIGHT: 70 IN

## 2019-05-08 DIAGNOSIS — K43.9 VENTRAL HERNIA WITHOUT OBSTRUCTION OR GANGRENE: ICD-10-CM

## 2019-05-08 DIAGNOSIS — Z98.890 OTHER SPECIFIED POSTPROCEDURAL STATES: Chronic | ICD-10-CM

## 2019-05-08 DIAGNOSIS — K42.9 UMBILICAL HERNIA WITHOUT OBSTRUCTION OR GANGRENE: ICD-10-CM

## 2019-05-08 LAB
ALBUMIN SERPL ELPH-MCNC: 4.2 G/DL — SIGNIFICANT CHANGE UP (ref 3.3–5)
ALP SERPL-CCNC: 174 U/L — HIGH (ref 40–120)
ALT FLD-CCNC: 118 U/L — HIGH (ref 12–78)
ANION GAP SERPL CALC-SCNC: 1 MMOL/L — LOW (ref 5–17)
ANISOCYTOSIS BLD QL: SIGNIFICANT CHANGE UP
APTT BLD: 33.3 SEC — SIGNIFICANT CHANGE UP (ref 27.5–36.3)
AST SERPL-CCNC: 64 U/L — HIGH (ref 15–37)
BASOPHILS # BLD AUTO: 0.02 K/UL — SIGNIFICANT CHANGE UP (ref 0–0.2)
BASOPHILS NFR BLD AUTO: 0.3 % — SIGNIFICANT CHANGE UP (ref 0–2)
BILIRUB SERPL-MCNC: 0.8 MG/DL — SIGNIFICANT CHANGE UP (ref 0.2–1.2)
BLD GP AB SCN SERPL QL: SIGNIFICANT CHANGE UP
BUN SERPL-MCNC: 13 MG/DL — SIGNIFICANT CHANGE UP (ref 7–23)
CALCIUM SERPL-MCNC: 9.7 MG/DL — SIGNIFICANT CHANGE UP (ref 8.5–10.1)
CHLORIDE SERPL-SCNC: 106 MMOL/L — SIGNIFICANT CHANGE UP (ref 96–108)
CO2 SERPL-SCNC: 31 MMOL/L — SIGNIFICANT CHANGE UP (ref 22–31)
CREAT SERPL-MCNC: 0.95 MG/DL — SIGNIFICANT CHANGE UP (ref 0.5–1.3)
EOSINOPHIL # BLD AUTO: 0.15 K/UL — SIGNIFICANT CHANGE UP (ref 0–0.5)
EOSINOPHIL NFR BLD AUTO: 1.9 % — SIGNIFICANT CHANGE UP (ref 0–6)
GLUCOSE SERPL-MCNC: 94 MG/DL — SIGNIFICANT CHANGE UP (ref 70–99)
HCT VFR BLD CALC: 39.6 % — SIGNIFICANT CHANGE UP (ref 39–50)
HGB BLD-MCNC: 12 G/DL — LOW (ref 13–17)
IMM GRANULOCYTES NFR BLD AUTO: 0.4 % — SIGNIFICANT CHANGE UP (ref 0–1.5)
INR BLD: 1.08 RATIO — SIGNIFICANT CHANGE UP (ref 0.88–1.16)
LYMPHOCYTES # BLD AUTO: 2.73 K/UL — SIGNIFICANT CHANGE UP (ref 1–3.3)
LYMPHOCYTES # BLD AUTO: 34.4 % — SIGNIFICANT CHANGE UP (ref 13–44)
MANUAL SMEAR VERIFICATION: SIGNIFICANT CHANGE UP
MCHC RBC-ENTMCNC: 20.3 PG — LOW (ref 27–34)
MCHC RBC-ENTMCNC: 30.3 GM/DL — LOW (ref 32–36)
MCV RBC AUTO: 67.1 FL — LOW (ref 80–100)
MICROCYTES BLD QL: SIGNIFICANT CHANGE UP
MONOCYTES # BLD AUTO: 0.64 K/UL — SIGNIFICANT CHANGE UP (ref 0–0.9)
MONOCYTES NFR BLD AUTO: 8.1 % — SIGNIFICANT CHANGE UP (ref 2–14)
NEUTROPHILS # BLD AUTO: 4.36 K/UL — SIGNIFICANT CHANGE UP (ref 1.8–7.4)
NEUTROPHILS NFR BLD AUTO: 54.9 % — SIGNIFICANT CHANGE UP (ref 43–77)
NRBC # BLD: 0 /100 WBCS — SIGNIFICANT CHANGE UP (ref 0–0)
PLAT MORPH BLD: NORMAL — SIGNIFICANT CHANGE UP
PLATELET # BLD AUTO: 381 K/UL — SIGNIFICANT CHANGE UP (ref 150–400)
POTASSIUM SERPL-MCNC: 4.6 MMOL/L — SIGNIFICANT CHANGE UP (ref 3.5–5.3)
POTASSIUM SERPL-SCNC: 4.6 MMOL/L — SIGNIFICANT CHANGE UP (ref 3.5–5.3)
PROT SERPL-MCNC: 8.1 GM/DL — SIGNIFICANT CHANGE UP (ref 6–8.3)
PROTHROM AB SERPL-ACNC: 12 SEC — SIGNIFICANT CHANGE UP (ref 10–12.9)
RBC # BLD: 5.9 M/UL — HIGH (ref 4.2–5.8)
RBC # FLD: 15 % — HIGH (ref 10.3–14.5)
RBC BLD AUTO: ABNORMAL
SODIUM SERPL-SCNC: 138 MMOL/L — SIGNIFICANT CHANGE UP (ref 135–145)
TYPE + AB SCN PNL BLD: SIGNIFICANT CHANGE UP
WBC # BLD: 7.93 K/UL — SIGNIFICANT CHANGE UP (ref 3.8–10.5)
WBC # FLD AUTO: 7.93 K/UL — SIGNIFICANT CHANGE UP (ref 3.8–10.5)

## 2019-05-08 NOTE — H&P PST ADULT - HISTORY OF PRESENT ILLNESS
33 year old male with periumbilical ventral hernia Pt with chronic hernia however experienced severe abdominal pain in March 2019; denies bowel involvement ; he presents to PST for open ventral hernia repair possible mesh placement possible exploratory laparotomy

## 2019-05-08 NOTE — H&P PST ADULT - NSICDXPASTMEDICALHX_GEN_ALL_CORE_FT
PAST MEDICAL HISTORY:  Anxiety     Bipolar disorder     Cholelithiases     Depression     Fatty liver     Heart murmur     Hiatal hernia     PRASANNA on CPAP     Ventral hernia

## 2019-05-08 NOTE — H&P PST ADULT - ASSESSMENT
33 year old male presents to PST for open ventral hernia repair possible mesh placement possible exploratory laparotomy     Plan:  1. PST instructions given ; NPO post midnight   2. Pt instructed to take following meds with sip of water : lamisil lamictal lexaprol   3. EZ wash instructions given & mupirocin instructions given  4. Medical Evaluation with Dr Milian   5. Stop NSAIDS ( Aspirin Alev Motrin Mobic Diclofenac), herbal supplements , MVI , Vitamin fish oil 7 days prior to surgery  unless directed by surgeon or cardiologist;   6. Labs as per surgeon request 33 year old male presents to PST for open ventral hernia repair possible mesh placement possible exploratory laparotomy     Plan:  1. PST instructions given ; NPO post midnight   2. Pt instructed to take following meds with sip of water : lamisil lamictal lexaprol   3. EZ wash instructions given & mupirocin instructions given  4. Medical Evaluation with Dr Milian   5. Stop NSAIDS ( Aspirin Alev Motrin Mobic Diclofenac), herbal supplements , MVI , Vitamin fish oil 7 days prior to surgery  unless directed by surgeon or cardiologist;   6. Labs as per surgeon request   7. anesthesia records obtained pt said he had complications after hiatal hernia repair in 2015

## 2019-05-08 NOTE — H&P PST ADULT - ANESTHESIA, PREVIOUS REACTION, PROFILE
none aspiration Pt said he aspirated after surgery and was transferred to ICU; unable to recall events specifically pt was sedated ; will obtain anesthesia records from medical records

## 2019-05-09 NOTE — ED BEHAVIORAL HEALTH ASSESSMENT NOTE - CURRENT PLAN
Render Post-Care Instructions In Note?: no Number Of Freeze-Thaw Cycles: 2 freeze-thaw cycles Medical Necessity Clause: This procedure was medically necessary because the lesions that were treated were: Consent: The patient's consent was obtained including but not limited to risks of crusting, scabbing, blistering, scarring, darker or lighter pigmentary change, recurrence, incomplete removal and infection. Medical Necessity Information: It is in your best interest to select a reason for this procedure from the list below. All of these items fulfill various CMS LCD requirements except the new and changing color options. Include Z78.9 (Other Specified Conditions Influencing Health Status) As An Associated Diagnosis?: Yes Post-Care Instructions: I reviewed with the patient in detail post-care instructions. Patient is to wear sunprotection, and avoid picking at any of the treated lesions. Pt may apply Vaseline to crusted or scabbing areas. Detail Level: Simple None known

## 2019-05-13 RX ORDER — FENTANYL CITRATE 50 UG/ML
50 INJECTION INTRAVENOUS
Refills: 0 | Status: DISCONTINUED | OUTPATIENT
Start: 2019-05-14 | End: 2019-05-14

## 2019-05-13 RX ORDER — SODIUM CHLORIDE 9 MG/ML
1000 INJECTION, SOLUTION INTRAVENOUS
Refills: 0 | Status: DISCONTINUED | OUTPATIENT
Start: 2019-05-14 | End: 2019-05-14

## 2019-05-13 RX ORDER — ONDANSETRON 8 MG/1
4 TABLET, FILM COATED ORAL ONCE
Refills: 0 | Status: DISCONTINUED | OUTPATIENT
Start: 2019-05-14 | End: 2019-05-14

## 2019-05-14 ENCOUNTER — OUTPATIENT (OUTPATIENT)
Dept: OUTPATIENT SERVICES | Facility: HOSPITAL | Age: 34
LOS: 1 days | Discharge: HOME | End: 2019-05-14
Payer: COMMERCIAL

## 2019-05-14 ENCOUNTER — APPOINTMENT (OUTPATIENT)
Dept: SURGERY | Facility: HOSPITAL | Age: 34
End: 2019-05-14

## 2019-05-14 VITALS
TEMPERATURE: 99 F | HEART RATE: 87 BPM | OXYGEN SATURATION: 97 % | DIASTOLIC BLOOD PRESSURE: 75 MMHG | WEIGHT: 197.98 LBS | HEIGHT: 70 IN | RESPIRATION RATE: 16 BRPM | SYSTOLIC BLOOD PRESSURE: 128 MMHG

## 2019-05-14 VITALS
SYSTOLIC BLOOD PRESSURE: 120 MMHG | DIASTOLIC BLOOD PRESSURE: 78 MMHG | RESPIRATION RATE: 15 BRPM | OXYGEN SATURATION: 100 % | HEART RATE: 85 BPM | TEMPERATURE: 98 F

## 2019-05-14 DIAGNOSIS — Z98.890 OTHER SPECIFIED POSTPROCEDURAL STATES: Chronic | ICD-10-CM

## 2019-05-14 DIAGNOSIS — K42.9 UMBILICAL HERNIA WITHOUT OBSTRUCTION OR GANGRENE: ICD-10-CM

## 2019-05-14 PROCEDURE — 49560: CPT

## 2019-05-14 RX ORDER — OXYCODONE HYDROCHLORIDE 5 MG/1
1 TABLET ORAL
Qty: 20 | Refills: 0
Start: 2019-05-14 | End: 2019-05-18

## 2019-05-14 RX ORDER — OXYCODONE HYDROCHLORIDE 5 MG/1
10 TABLET ORAL ONCE
Refills: 0 | Status: DISCONTINUED | OUTPATIENT
Start: 2019-05-14 | End: 2019-05-14

## 2019-05-14 RX ADMIN — SODIUM CHLORIDE 75 MILLILITER(S): 9 INJECTION, SOLUTION INTRAVENOUS at 12:38

## 2019-05-14 RX ADMIN — OXYCODONE HYDROCHLORIDE 10 MILLIGRAM(S): 5 TABLET ORAL at 13:08

## 2019-05-14 RX ADMIN — FENTANYL CITRATE 50 MICROGRAM(S): 50 INJECTION INTRAVENOUS at 13:00

## 2019-05-14 NOTE — ASU DISCHARGE PLAN (ADULT/PEDIATRIC) - CARE PROVIDER_API CALL
Ann Shukla (DO)  Surgery  [address] [facility], [address] [place] [address]  Phone: [phone]  Fax: [phone]  Follow Up Time: 2 weeks

## 2019-05-14 NOTE — ASU PATIENT PROFILE, ADULT - ANESTHESIA, PREVIOUS REACTION, PROFILE
Pt reports h/o delayed awakening, possible aspiration and need for recovery in ICU/delayed awakening

## 2019-05-14 NOTE — ASU DISCHARGE PLAN (ADULT/PEDIATRIC) - BATHING
No change/Do not submerge in water showers can resume after dressing removed in 4 days/Do not submerge in water/Shower only

## 2019-05-14 NOTE — ASU DISCHARGE PLAN (ADULT/PEDIATRIC) - CALL YOUR DOCTOR IF YOU HAVE ANY OF THE FOLLOWING:
Swelling that gets worse/Pain not relieved by Medications/Unable to urinate/Fever greater than (need to indicate Fahrenheit or Celsius)/Bleeding that does not stop/Increased irritability or sluggishness/Nausea and vomiting that does not stop/Excessive diarrhea/Wound/Surgical Site with redness, or foul smelling discharge or pus/Numbness, tingling, color or temperature change to extremity/Inability to tolerate liquids or foods

## 2019-05-14 NOTE — ASU DISCHARGE PLAN (ADULT/PEDIATRIC) - ASU DC SPECIAL INSTRUCTIONSFT
Please follow up in Dr. Shukla surgery office in 2 weeks following surgery. If any acute changes in medical condition report to emergency department.

## 2019-05-14 NOTE — ASU PATIENT PROFILE, ADULT - PMH
Anxiety    Bipolar disorder    Cholelithiases    Depression    Fatty liver    Heart murmur    Hiatal hernia    PRASANNA on CPAP    Ventral hernia

## 2019-05-14 NOTE — BRIEF OPERATIVE NOTE - OPERATION/FINDINGS
1.5 cm periumbilical ventral hernia defect, repaired primarily. Hernia contents were viable omentum, reduced

## 2019-05-16 PROBLEM — K80.20 CALCULUS OF GALLBLADDER WITHOUT CHOLECYSTITIS WITHOUT OBSTRUCTION: Chronic | Status: ACTIVE | Noted: 2019-05-08

## 2019-05-16 PROBLEM — K76.0 FATTY (CHANGE OF) LIVER, NOT ELSEWHERE CLASSIFIED: Chronic | Status: ACTIVE | Noted: 2019-05-08

## 2019-05-16 PROBLEM — R01.1 CARDIAC MURMUR, UNSPECIFIED: Chronic | Status: ACTIVE | Noted: 2019-05-08

## 2019-05-16 PROBLEM — K43.9 VENTRAL HERNIA WITHOUT OBSTRUCTION OR GANGRENE: Chronic | Status: ACTIVE | Noted: 2019-05-08

## 2019-05-16 PROBLEM — G47.33 OBSTRUCTIVE SLEEP APNEA (ADULT) (PEDIATRIC): Chronic | Status: ACTIVE | Noted: 2019-05-08

## 2019-05-20 DIAGNOSIS — K43.9 VENTRAL HERNIA WITHOUT OBSTRUCTION OR GANGRENE: ICD-10-CM

## 2019-05-20 DIAGNOSIS — G47.10 HYPERSOMNIA, UNSPECIFIED: ICD-10-CM

## 2019-05-20 DIAGNOSIS — Z99.89 DEPENDENCE ON OTHER ENABLING MACHINES AND DEVICES: ICD-10-CM

## 2019-05-20 DIAGNOSIS — F31.9 BIPOLAR DISORDER, UNSPECIFIED: ICD-10-CM

## 2019-05-20 DIAGNOSIS — K21.9 GASTRO-ESOPHAGEAL REFLUX DISEASE WITHOUT ESOPHAGITIS: ICD-10-CM

## 2019-05-20 DIAGNOSIS — G47.33 OBSTRUCTIVE SLEEP APNEA (ADULT) (PEDIATRIC): ICD-10-CM

## 2019-05-20 DIAGNOSIS — K76.0 FATTY (CHANGE OF) LIVER, NOT ELSEWHERE CLASSIFIED: ICD-10-CM

## 2019-05-20 DIAGNOSIS — F41.9 ANXIETY DISORDER, UNSPECIFIED: ICD-10-CM

## 2019-05-20 DIAGNOSIS — D56.3 THALASSEMIA MINOR: ICD-10-CM

## 2019-06-03 ENCOUNTER — APPOINTMENT (OUTPATIENT)
Dept: SURGERY | Facility: CLINIC | Age: 34
End: 2019-06-03
Payer: COMMERCIAL

## 2019-06-03 VITALS
BODY MASS INDEX: 27.92 KG/M2 | TEMPERATURE: 98.5 F | HEIGHT: 70 IN | SYSTOLIC BLOOD PRESSURE: 130 MMHG | OXYGEN SATURATION: 96 % | HEART RATE: 91 BPM | DIASTOLIC BLOOD PRESSURE: 78 MMHG | WEIGHT: 195 LBS

## 2019-06-03 PROCEDURE — 99024 POSTOP FOLLOW-UP VISIT: CPT

## 2019-06-03 NOTE — ASSESSMENT
[FreeTextEntry1] : A/P:\par S/P ventral hernia repair\par Well healed\par RTO prn\par No strenuous activities or heavy lifting x 6 weeks

## 2019-06-03 NOTE — HISTORY OF PRESENT ILLNESS
[de-identified] : Pt seen and examined at bedside with chaperone. Pt is AAOx3, pt in no acute distress. Pt denied c/o fever, chills, chest pain, SOB, abd pain, N/V/D, extremity pain or dysfunction, hemoptysis, hematemesis, hematuria, hematochexia, headache, diplopia, vertigo, dizzyness. Pt tolerating diet, (+) void, (+) ambulation, (+) bowel function

## 2019-06-03 NOTE — PHYSICAL EXAM
[JVD] : no jugular venous distention  [Alert] : alert [No Rash or Lesion] : No rash or lesion [Oriented to Person] : oriented to person [Oriented to Time] : oriented to time [Oriented to Place] : oriented to place [de-identified] : bowel sounds (+), soft, non distended, no rebound, no guarding, no rigidity, no skin changes to exam. Incision site is clean, dry, intact, no cellulitis, no d/c, no purulence, no fluctuance. No tenderness to exam [de-identified] : no distress [de-identified] : normal affect

## 2019-06-21 ENCOUNTER — TRANSCRIPTION ENCOUNTER (OUTPATIENT)
Age: 34
End: 2019-06-21

## 2019-07-01 ENCOUNTER — APPOINTMENT (OUTPATIENT)
Dept: NEUROLOGY | Facility: CLINIC | Age: 34
End: 2019-07-01
Payer: COMMERCIAL

## 2019-07-01 VITALS
HEIGHT: 70 IN | DIASTOLIC BLOOD PRESSURE: 58 MMHG | WEIGHT: 195 LBS | BODY MASS INDEX: 27.92 KG/M2 | SYSTOLIC BLOOD PRESSURE: 110 MMHG | HEART RATE: 100 BPM

## 2019-07-01 PROCEDURE — 99214 OFFICE O/P EST MOD 30 MIN: CPT

## 2019-07-01 RX ORDER — AMPHETAMINE 9.4 MG/1
9.4 TABLET, ORALLY DISINTEGRATING ORAL
Refills: 0 | Status: DISCONTINUED | COMMUNITY

## 2019-07-01 RX ORDER — DEXTROAMPHETAMINE SACCHARATE, AMPHETAMINE ASPARTATE MONOHYDRATE, DEXTROAMPHETAMINE SULFATE AND AMPHETAMINE SULFATE 5; 5; 5; 5 MG/1; MG/1; MG/1; MG/1
20 CAPSULE, EXTENDED RELEASE ORAL DAILY
Qty: 30 | Refills: 0 | Status: DISCONTINUED | COMMUNITY
Start: 2019-02-20 | End: 2019-07-01

## 2019-07-01 NOTE — DISCUSSION/SUMMARY
[FreeTextEntry1] : 1. PRASANNA - He recently has not been compliant with PAP due to surgery.\par He has restarted use of CPAP and will gradually transition to BPAP setting of 10/6 cm H2O. \par \par 2. Hypersomnia - He has hypersomnia despite adequately treated PRASANNA. It is unclear if he has Narcolepsy but he has no features other than excessive daytime sleepiness.\par Adderall has been effective in improving his daytime sleepiness except for in the early morning. \par Will increase immediate release Adderall to 20 mg and continue Adderall XR 30 mg/day.\par Will investigate for Narcolepsy with PSG + MSLT. PSG should be done with fixed CPAP of 10 cm H2O. \par He will have to stop Adderall a few days prior to the study to avoid a false positive result from stimulant withdrawal.\par MRI brain and labs for other causes of hypersomnolence.\par \par Follow-up after sleep study, sooner if needed. \par

## 2019-07-01 NOTE — HISTORY OF PRESENT ILLNESS
[FreeTextEntry1] : I last saw Mr. Sam on 4/22/19.\par \par He received his BPAP machine, but right after he received it he had surgery for his umbilical hernia.\par He has not been able to use his BPAP machine regularly because of erratic sleep patterns.\par When he did use it on BPAP mode he found it to be uncomfortable. \par He reset the machine to a CPAP setting and has used it every night this week at a setting of 10 cm H2O. \par He is planning on gradually transitioning to 10/6 cm H2O.\par He thinks that some of his issues with sleep were related to stimulant withdrawal.\par \par He finds that Adderall is working well to keep him up during the day, but he still finds that it takes a couple of hours to kick in, even with the 10 mg immediate release. He finds that it is active about an hour earlier with the 10 mg immediate release. \par He finds it very difficult to wake up in the morning.\par He feels sleepy when driving to work. He is able to keep himself awake until he hamlin but on four occasions he has fallen asleep in his parking space.\par This has occurred both with and without use of CPAP.\par \par There is no history of dream enactment behavior, hypnagogic/hypnopompic hallucinations, sleep paralysis or cataplexy.\par \par His Austin Sleepiness Scale Score is 11/2 (with Adderall).\par

## 2019-07-05 ENCOUNTER — OTHER (OUTPATIENT)
Age: 34
End: 2019-07-05

## 2019-07-09 ENCOUNTER — OTHER (OUTPATIENT)
Age: 34
End: 2019-07-09

## 2019-07-09 DIAGNOSIS — D64.9 ANEMIA, UNSPECIFIED: ICD-10-CM

## 2019-07-15 ENCOUNTER — FORM ENCOUNTER (OUTPATIENT)
Age: 34
End: 2019-07-15

## 2019-07-16 ENCOUNTER — APPOINTMENT (OUTPATIENT)
Dept: MRI IMAGING | Facility: CLINIC | Age: 34
End: 2019-07-16
Payer: COMMERCIAL

## 2019-07-16 ENCOUNTER — OUTPATIENT (OUTPATIENT)
Dept: OUTPATIENT SERVICES | Facility: HOSPITAL | Age: 34
LOS: 1 days | End: 2019-07-16
Payer: COMMERCIAL

## 2019-07-16 DIAGNOSIS — Z98.890 OTHER SPECIFIED POSTPROCEDURAL STATES: Chronic | ICD-10-CM

## 2019-07-16 DIAGNOSIS — Z00.8 ENCOUNTER FOR OTHER GENERAL EXAMINATION: ICD-10-CM

## 2019-07-16 PROCEDURE — 70551 MRI BRAIN STEM W/O DYE: CPT

## 2019-07-16 PROCEDURE — 70551 MRI BRAIN STEM W/O DYE: CPT | Mod: 26

## 2019-09-09 ENCOUNTER — APPOINTMENT (OUTPATIENT)
Dept: SLEEP CENTER | Facility: CLINIC | Age: 34
End: 2019-09-09
Payer: COMMERCIAL

## 2019-09-09 ENCOUNTER — OUTPATIENT (OUTPATIENT)
Dept: OUTPATIENT SERVICES | Facility: HOSPITAL | Age: 34
LOS: 1 days | End: 2019-09-09
Payer: COMMERCIAL

## 2019-09-09 DIAGNOSIS — Z98.890 OTHER SPECIFIED POSTPROCEDURAL STATES: Chronic | ICD-10-CM

## 2019-09-09 PROCEDURE — 95810 POLYSOM 6/> YRS 4/> PARAM: CPT | Mod: 26

## 2019-09-10 DIAGNOSIS — G47.33 OBSTRUCTIVE SLEEP APNEA (ADULT) (PEDIATRIC): ICD-10-CM

## 2019-09-10 PROCEDURE — 95805 MULTIPLE SLEEP LATENCY TEST: CPT

## 2019-09-10 PROCEDURE — 95805 MULTIPLE SLEEP LATENCY TEST: CPT | Mod: 26

## 2019-09-10 PROCEDURE — 95810 POLYSOM 6/> YRS 4/> PARAM: CPT

## 2019-09-16 ENCOUNTER — OTHER (OUTPATIENT)
Age: 34
End: 2019-09-16

## 2019-09-27 ENCOUNTER — OTHER (OUTPATIENT)
Age: 34
End: 2019-09-27

## 2019-10-07 ENCOUNTER — OUTPATIENT (OUTPATIENT)
Dept: OUTPATIENT SERVICES | Facility: HOSPITAL | Age: 34
LOS: 1 days | End: 2019-10-07
Payer: COMMERCIAL

## 2019-10-07 ENCOUNTER — APPOINTMENT (OUTPATIENT)
Dept: SLEEP CENTER | Facility: CLINIC | Age: 34
End: 2019-10-07
Payer: COMMERCIAL

## 2019-10-07 DIAGNOSIS — Z98.890 OTHER SPECIFIED POSTPROCEDURAL STATES: Chronic | ICD-10-CM

## 2019-10-07 PROCEDURE — 95811 POLYSOM 6/>YRS CPAP 4/> PARM: CPT | Mod: 26

## 2019-10-07 PROCEDURE — 95811 POLYSOM 6/>YRS CPAP 4/> PARM: CPT

## 2019-10-08 DIAGNOSIS — G47.33 OBSTRUCTIVE SLEEP APNEA (ADULT) (PEDIATRIC): ICD-10-CM

## 2019-10-14 ENCOUNTER — OTHER (OUTPATIENT)
Age: 34
End: 2019-10-14

## 2019-10-22 ENCOUNTER — OTHER (OUTPATIENT)
Age: 34
End: 2019-10-22

## 2019-10-30 ENCOUNTER — OTHER (OUTPATIENT)
Age: 34
End: 2019-10-30

## 2019-10-31 ENCOUNTER — OTHER (OUTPATIENT)
Age: 34
End: 2019-10-31

## 2019-11-26 ENCOUNTER — APPOINTMENT (OUTPATIENT)
Dept: NEUROLOGY | Facility: CLINIC | Age: 34
End: 2019-11-26
Payer: COMMERCIAL

## 2019-11-26 VITALS
DIASTOLIC BLOOD PRESSURE: 82 MMHG | SYSTOLIC BLOOD PRESSURE: 123 MMHG | HEART RATE: 106 BPM | BODY MASS INDEX: 26.48 KG/M2 | WEIGHT: 185 LBS | HEIGHT: 70 IN

## 2019-11-26 PROCEDURE — 99214 OFFICE O/P EST MOD 30 MIN: CPT

## 2019-11-26 RX ORDER — TERBINAFINE HYDROCHLORIDE 250 MG/1
250 TABLET ORAL
Refills: 0 | Status: DISCONTINUED | COMMUNITY
End: 2019-11-26

## 2019-11-26 NOTE — CONSULT LETTER
[Dear  ___] : Dear  [unfilled], [Consult Letter:] : I had the pleasure of evaluating your patient, [unfilled]. [Please see my note below.] : Please see my note below. [Consult Closing:] : Thank you very much for allowing me to participate in the care of this patient.  If you have any questions, please do not hesitate to contact me. [FreeTextEntry2] : Donald Milian [FreeTextEntry3] : Sincerely,\par \par \par Chikis Chase MD\par Diplomate, American Academy of Psychiatry and Neurology\par Board Certified in the Subspecialty of Clinical Neurophysiology\par Board Certified in the Subspecialty of Sleep Medicine\par Board Certified in the Subspecialty of Epilepsy\par

## 2019-11-26 NOTE — DATA REVIEWED
[de-identified] : MRI brain 7/17/19: Unremarkable [de-identified] : Blood tests 10/25/18: TSH 1.27 \par blood tests 7/3/19: TSH 0.6, Hb 11.9 with MCV 65, MCH 19.6, MCHC 30.2, ferritin 326, vitamin B12 404\par \par Polysomnogram 9/9/19: Mild PRASANNA with AHI of 12.5, REM latency 179.5\par MSLT 9/10/19: Man sleep latency 8.2 minutes. NO sleep onset REM periods\par \par CPAP titration report 10/7/19: effective at CPAP 7 cm H2O using a [employer] Full face mask size small

## 2019-11-26 NOTE — DISCUSSION/SUMMARY
[FreeTextEntry1] : Arnulfo Alvarez is a 33 year old man with chronic hypersomnia.\par \par 1. PRASANNA - He recently has not been compliant with PAP. He will restart use of CPAP.\par He also has a consultation scheduled with Dr. Reed to discuss tonsillectomy. \par \par \par 2. Hypersomnia - He has hypersomnia despite adequately treated PRASANNA  in the past.\par Repeat PSG and MSLT were not suggestive of Narcolepsy, even when PSG was done without CPAP.\par His presentation is more suggestive of idiopathic hypersomnia given his sleep inertia.\par MRI brain was unremarkable.\par Provigil was not effective in the past.\par Adderall at a dose of 20 mg XR and then 30 mg XR were initially effective but now losing effect.\par Will increase standing dose to 40 mg/day with a 10 mg XR and 30 mg XR pill. He can continue to use the 20 mg immediate release pill in the AM.\par He should not take more than this total of 60 mg/day. \par If not effective, will try to get approval for Nuvigil or Vyvanse.\par \par We discussed the need to increase sleep time. \par \par He should follow up with his primary care doctor to discuss other potential causes of sleepiness - ? anemia related to thalassemia. \par \par f/u 2-3 months, sooner if needed.

## 2019-11-26 NOTE — HISTORY OF PRESENT ILLNESS
[FreeTextEntry1] : I last saw Arnulfo Alvarez on 7/1/19.\par \par He continues to feel very sleepy.\par \par He is sleeping for 6-7 hours per night. \par He has a hard time getting more sleep because something always interrupts him between 4-6 AM. He is hesitant to go back to sleep because he will have a hard time waking up when he has to at 6 AM. \par He has not used his CPAP machine in about 2.5 weeks.\par He has felt frustrated that nothing seemed to work and he was waiting for a new mask which just arrived yesterday.\par \par He feels as if his stimulants are losing effectiveness. He has been adding caffeine pills which work to keep him awake but make him feel manic.\par He is taking Adderall XR 30 mg and 20 mg immediate release all in the morning.\par He finds that it is not as potent as it had been. \par He is not falling asleep but does not feel as good as he used to.\par Every now and then he has a bad day and struggles to keep his eyes open during the drive to work. \par This happens about three times per week.\par \par If he wakes up during the night he sometimes will intentionally stay up so that he will not have difficulty waking up in the morning. \par \par There is no history of dream enactment behavior, hypnagogic/hypnopompic hallucinations or sleep paralysis.\par He sometimes experiences muscle weakness (difficulty holding a cup) which occurs randomly and not triggered by emotion.\par \par He will be seeing Dr. Reed to consult about tonsillectomy.\par \par Previous trials of medications:\par Provigil - Not helpful - went up to 400 mg/day\par \par

## 2019-11-26 NOTE — PHYSICAL EXAM
[FreeTextEntry1] : Examination:\par Constitutional: normal, no apparent distress\par oropharynx: tonsils 3+\par Eyes: normal conjunctiva b/l, no ptosis, visual fields full\par Respiratory: no respiratory distress, normal effort, normal auscultation\par Cardiovascular: normal rate, rhythm, no murmurs\par Neck: supple, no masses\par Vascular: carotids normal\par Skin: normal color, no rashes\par Psych: normal mood, affect\par \par Neurological:\par Memory: normal memory, oriented to person, place, time\par Language intact/no aphasia\par Cranial Nerves: II-XII intact, Pupils equally round and reactive to light, ocular muscles/movements intact, no ptosis, no facial weakness, tongue protrudes normally in the midline, \par Motor: normal tone, no pronator drift, full strength in all four extremities in the proximal and distal muscle groups\par Coordination: Fine motor movements intact, rapid alternating movements intact, finger to nose intact bilaterally\par Sensory: intact to light touch\par DTRs: symmetric, normal\par Gait: narrow based, steady\par

## 2020-01-21 ENCOUNTER — TRANSCRIPTION ENCOUNTER (OUTPATIENT)
Age: 35
End: 2020-01-21

## 2020-02-03 RX ORDER — ONDANSETRON 8 MG/1
4 TABLET, FILM COATED ORAL ONCE
Refills: 0 | Status: DISCONTINUED | OUTPATIENT
Start: 2020-02-04 | End: 2020-02-04

## 2020-02-03 RX ORDER — FENTANYL CITRATE 50 UG/ML
50 INJECTION INTRAVENOUS
Refills: 0 | Status: DISCONTINUED | OUTPATIENT
Start: 2020-02-04 | End: 2020-02-04

## 2020-02-03 RX ORDER — SODIUM CHLORIDE 9 MG/ML
1000 INJECTION, SOLUTION INTRAVENOUS
Refills: 0 | Status: DISCONTINUED | OUTPATIENT
Start: 2020-02-04 | End: 2020-02-04

## 2020-02-03 NOTE — ASU PATIENT PROFILE, ADULT - PMH
Anxiety    Bipolar disorder    Cholelithiases    Depression    Fatty liver    GERD (gastroesophageal reflux disease)    H/O hypogonadism    H/O thalassemia    Heart murmur    Hiatal hernia    PRASANNA on CPAP    Ventral hernia

## 2020-02-04 ENCOUNTER — OUTPATIENT (OUTPATIENT)
Dept: INPATIENT UNIT | Facility: HOSPITAL | Age: 35
LOS: 1 days | Discharge: HOME | End: 2020-02-04
Payer: COMMERCIAL

## 2020-02-04 ENCOUNTER — RESULT REVIEW (OUTPATIENT)
Age: 35
End: 2020-02-04

## 2020-02-04 VITALS
DIASTOLIC BLOOD PRESSURE: 78 MMHG | OXYGEN SATURATION: 97 % | HEIGHT: 69.5 IN | WEIGHT: 192.02 LBS | SYSTOLIC BLOOD PRESSURE: 131 MMHG | HEART RATE: 84 BPM | RESPIRATION RATE: 16 BRPM | TEMPERATURE: 99 F

## 2020-02-04 VITALS — HEART RATE: 90 BPM | OXYGEN SATURATION: 95 % | TEMPERATURE: 98 F | RESPIRATION RATE: 16 BRPM

## 2020-02-04 DIAGNOSIS — Z98.890 OTHER SPECIFIED POSTPROCEDURAL STATES: Chronic | ICD-10-CM

## 2020-02-04 DIAGNOSIS — G47.33 OBSTRUCTIVE SLEEP APNEA (ADULT) (PEDIATRIC): ICD-10-CM

## 2020-02-04 DIAGNOSIS — J35.1 HYPERTROPHY OF TONSILS: ICD-10-CM

## 2020-02-04 PROCEDURE — 88304 TISSUE EXAM BY PATHOLOGIST: CPT | Mod: 26

## 2020-02-04 PROCEDURE — 88305 TISSUE EXAM BY PATHOLOGIST: CPT

## 2020-02-04 PROCEDURE — 88305 TISSUE EXAM BY PATHOLOGIST: CPT | Mod: 26

## 2020-02-04 PROCEDURE — 88304 TISSUE EXAM BY PATHOLOGIST: CPT

## 2020-02-04 RX ORDER — FAMOTIDINE 10 MG/ML
20 INJECTION INTRAVENOUS ONCE
Refills: 0 | Status: COMPLETED | OUTPATIENT
Start: 2020-02-04 | End: 2020-02-04

## 2020-02-04 RX ORDER — MORPHINE SULFATE 50 MG/1
4 CAPSULE, EXTENDED RELEASE ORAL EVERY 4 HOURS
Refills: 0 | Status: DISCONTINUED | OUTPATIENT
Start: 2020-02-04 | End: 2020-02-04

## 2020-02-04 RX ORDER — SODIUM CHLORIDE 9 MG/ML
3 INJECTION INTRAMUSCULAR; INTRAVENOUS; SUBCUTANEOUS EVERY 8 HOURS
Refills: 0 | Status: DISCONTINUED | OUTPATIENT
Start: 2020-02-04 | End: 2020-02-04

## 2020-02-04 RX ORDER — ONDANSETRON 8 MG/1
4 TABLET, FILM COATED ORAL EVERY 6 HOURS
Refills: 0 | Status: DISCONTINUED | OUTPATIENT
Start: 2020-02-04 | End: 2020-02-04

## 2020-02-04 RX ORDER — ACETAMINOPHEN 500 MG
975 TABLET ORAL ONCE
Refills: 0 | Status: COMPLETED | OUTPATIENT
Start: 2020-02-04 | End: 2020-02-04

## 2020-02-04 RX ORDER — FAMOTIDINE 10 MG/ML
1 INJECTION INTRAVENOUS
Qty: 0 | Refills: 0 | DISCHARGE

## 2020-02-04 RX ORDER — OXYCODONE HYDROCHLORIDE 5 MG/1
5 TABLET ORAL ONCE
Refills: 0 | Status: DISCONTINUED | OUTPATIENT
Start: 2020-02-04 | End: 2020-02-04

## 2020-02-04 RX ORDER — TERBINAFINE HCL 250 MG
1 TABLET ORAL
Qty: 0 | Refills: 0 | DISCHARGE

## 2020-02-04 RX ADMIN — FENTANYL CITRATE 50 MICROGRAM(S): 50 INJECTION INTRAVENOUS at 11:43

## 2020-02-04 RX ADMIN — Medication 975 MILLIGRAM(S): at 09:10

## 2020-02-04 RX ADMIN — OXYCODONE HYDROCHLORIDE 5 MILLIGRAM(S): 5 TABLET ORAL at 11:43

## 2020-02-04 RX ADMIN — Medication 975 MILLIGRAM(S): at 09:09

## 2020-02-04 NOTE — BRIEF OPERATIVE NOTE - NSICDXBRIEFPOSTOP_GEN_ALL_CORE_FT
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

## 2020-02-04 NOTE — BRIEF OPERATIVE NOTE - NSICDXBRIEFPREOP_GEN_ALL_CORE_FT
PRE-OP DIAGNOSIS:  Oropharyngeal neoplasm 04-Feb-2020 11:15:47  Arnulfo Reed  Uvular hypertrophy 04-Feb-2020 11:15:36  Arnulfo Reed  Tonsillar hypertrophy 04-Feb-2020 11:15:26  Arnulfo Reed

## 2020-02-04 NOTE — BRIEF OPERATIVE NOTE - NSICDXBRIEFPROCEDURE_GEN_ALL_CORE_FT
PROCEDURES:  Excision, lesion, oropharynx 04-Feb-2020 11:15:08  Arnulfo Reed  Uvulectomy 04-Feb-2020 11:14:52  Arnulfo Reed  Tonsillectomy, primary, age 12 or over 04-Feb-2020 11:14:45  Arnulfo Reed

## 2020-02-04 NOTE — ASU DISCHARGE PLAN (ADULT/PEDIATRIC) - CARE PROVIDER_API CALL
Arnulfo Reed)  Otolaryngology  [address] [place] [address]  Phone: [phone]  Fax: [phone]  Follow Up Time:

## 2020-02-09 DIAGNOSIS — K21.9 GASTRO-ESOPHAGEAL REFLUX DISEASE WITHOUT ESOPHAGITIS: ICD-10-CM

## 2020-02-09 DIAGNOSIS — J35.1 HYPERTROPHY OF TONSILS: ICD-10-CM

## 2020-02-09 DIAGNOSIS — G47.33 OBSTRUCTIVE SLEEP APNEA (ADULT) (PEDIATRIC): ICD-10-CM

## 2020-02-09 DIAGNOSIS — K13.79 OTHER LESIONS OF ORAL MUCOSA: ICD-10-CM

## 2020-02-09 DIAGNOSIS — D56.9 THALASSEMIA, UNSPECIFIED: ICD-10-CM

## 2020-02-09 DIAGNOSIS — D10.5 BENIGN NEOPLASM OF OTHER PARTS OF OROPHARYNX: ICD-10-CM

## 2020-02-09 DIAGNOSIS — F31.9 BIPOLAR DISORDER, UNSPECIFIED: ICD-10-CM

## 2020-02-11 ENCOUNTER — TRANSCRIPTION ENCOUNTER (OUTPATIENT)
Age: 35
End: 2020-02-11

## 2020-03-08 ENCOUNTER — TRANSCRIPTION ENCOUNTER (OUTPATIENT)
Age: 35
End: 2020-03-08

## 2020-03-09 ENCOUNTER — TRANSCRIPTION ENCOUNTER (OUTPATIENT)
Age: 35
End: 2020-03-09

## 2020-03-11 ENCOUNTER — APPOINTMENT (OUTPATIENT)
Dept: NEUROLOGY | Facility: CLINIC | Age: 35
End: 2020-03-11
Payer: COMMERCIAL

## 2020-03-11 VITALS
HEIGHT: 70 IN | BODY MASS INDEX: 26.48 KG/M2 | DIASTOLIC BLOOD PRESSURE: 91 MMHG | TEMPERATURE: 99.8 F | HEART RATE: 114 BPM | SYSTOLIC BLOOD PRESSURE: 133 MMHG | WEIGHT: 185 LBS

## 2020-03-11 DIAGNOSIS — R51 HEADACHE: ICD-10-CM

## 2020-03-11 PROCEDURE — 99214 OFFICE O/P EST MOD 30 MIN: CPT

## 2020-03-11 NOTE — DISCUSSION/SUMMARY
[FreeTextEntry1] : Arnulfo Alvarez is a 33 year old man with chronic hypersomnia.\par \par 1. PRASANNA - He had recent tonsillectomy.\par Will repeat Home sleep study to see if PRASANNA is still present and he needs to restart CPAP use.\par \par \par 2. Hypersomnia - He has hypersomnia despite adequately treated PRASANNA in the past.\par Repeat PSG and MSLT were not suggestive of Narcolepsy, even when PSG was done without CPAP.\par His presentation is more suggestive of idiopathic hypersomnia given his sleep inertia.\par MRI brain was unremarkable.\par Provigil was not effective in the past.\par Currently dose of Adderall 60 mg/day is effective but he is interested in other options.\par Refills were just sent. \par His Adderall dose is high and Vyvanse may not be adequate but can consider starting at 40 mg/day.\par When he is due for refill can also try Nuvigil 250 mg/day.\par \par -Facial pain/headache - Unclear source.\par Some features of sinus and others more suggestive of neuralgia.\par Will check CBC, ESR, CRP. \par Check CT sinuses.\par If no evidence of infection, can consider a trial of a medication for trigeminal neuralgia. \par \par f/u 1-2 months. Will call with results of CT scan.\par

## 2020-03-11 NOTE — HISTORY OF PRESENT ILLNESS
[FreeTextEntry1] : I last saw Mr. Alvarez on 11/26/19.\par He reports that he has been sick for 17 days with pain behind his eyes, his forehead and near his tooth.\par The pain is only on the right side. He says that the pain is shooting, sharp, and constant.  He has photophobia, phonophobia. He also had some sinus congestion.\par Initially he was treating with acetaminophen and decongestants.\par He eventually had a tele doctor appointment. He was prescribed amoxicillin. When he did not feel better after a few days he saw another physician who felt that he had the flu although his swab was negative.\par He says that he had a low grade fever almost every day. He says that the highest has been 99.8.\par \par He saw Dr. Reed and had a tonsillectomy on 2/4. \par \par He has not been using his CPAP machine.\par HIs significant other has not noted snoring since the tonsillectomy.\par \par He is still taking Adderall. He is taking Adderall XR 40 mg (30 + 10) in addition to a 20 mg immediate release in the AM. On most days this works well. \par His psychiatrist did start him on Lithium. \par

## 2020-03-11 NOTE — DATA REVIEWED
[de-identified] : MRI brain 7/17/19: Unremarkable [de-identified] : Blood tests 10/25/18: TSH 1.27 \par blood tests 7/3/19: TSH 0.6, Hb 11.9 with MCV 65, MCH 19.6, MCHC 30.2, ferritin 326, vitamin B12 404\par \par Polysomnogram 9/9/19: Mild PRASANNA with AHI of 12.5, REM latency 179.5\par MSLT 9/10/19: Man sleep latency 8.2 minutes. NO sleep onset REM periods\par \par CPAP titration report 10/7/19: effective at CPAP 7 cm H2O using a [employer] Full face mask size small

## 2020-03-11 NOTE — PHYSICAL EXAM
[FreeTextEntry1] : Examination:\par Constitutional: normal, no apparent distress\par Eyes: normal conjunctiva b/l, no ptosis, visual fields full\par Respiratory: no respiratory distress, normal effort, normal auscultation\par Cardiovascular: normal rate, rhythm, no murmurs\par Neck: supple, no masses\par Vascular: carotids normal\par Skin: normal color, no rashes\par Psych: normal mood, affect\par \par Neurological:\par Memory: normal memory, oriented to person, place, time\par Language intact/no aphasia\par Cranial Nerves: II-XII intact, Pupils equally round and reactive to light, ocular muscles/movements intact, no ptosis, no facial weakness, tongue protrudes normally in the midline, \par Motor: normal tone, no pronator drift, full strength in all four extremities in the proximal and distal muscle groups\par Coordination: Fine motor movements intact, rapid alternating movements intact, finger to nose intact bilaterally\par Sensory: intact to light touch\par DTRs: symmetric, normal\par Gait: narrow based, steady\par \par Tenderness over right forehead

## 2020-03-19 PROBLEM — K21.9 GASTRO-ESOPHAGEAL REFLUX DISEASE WITHOUT ESOPHAGITIS: Chronic | Status: ACTIVE | Noted: 2020-02-03

## 2020-03-19 PROBLEM — Z86.2 PERSONAL HISTORY OF DISEASES OF THE BLOOD AND BLOOD-FORMING ORGANS AND CERTAIN DISORDERS INVOLVING THE IMMUNE MECHANISM: Chronic | Status: ACTIVE | Noted: 2020-02-03

## 2020-03-19 PROBLEM — Z86.39 PERSONAL HISTORY OF OTHER ENDOCRINE, NUTRITIONAL AND METABOLIC DISEASE: Chronic | Status: ACTIVE | Noted: 2020-02-03

## 2020-03-23 ENCOUNTER — TRANSCRIPTION ENCOUNTER (OUTPATIENT)
Age: 35
End: 2020-03-23

## 2020-04-09 ENCOUNTER — APPOINTMENT (OUTPATIENT)
Dept: CT IMAGING | Facility: CLINIC | Age: 35
End: 2020-04-09

## 2020-04-15 ENCOUNTER — TRANSCRIPTION ENCOUNTER (OUTPATIENT)
Age: 35
End: 2020-04-15

## 2020-04-27 ENCOUNTER — TRANSCRIPTION ENCOUNTER (OUTPATIENT)
Age: 35
End: 2020-04-27

## 2020-05-18 ENCOUNTER — TRANSCRIPTION ENCOUNTER (OUTPATIENT)
Age: 35
End: 2020-05-18

## 2020-06-05 ENCOUNTER — TRANSCRIPTION ENCOUNTER (OUTPATIENT)
Age: 35
End: 2020-06-05

## 2020-06-19 ENCOUNTER — TRANSCRIPTION ENCOUNTER (OUTPATIENT)
Age: 35
End: 2020-06-19

## 2020-07-10 ENCOUNTER — TRANSCRIPTION ENCOUNTER (OUTPATIENT)
Age: 35
End: 2020-07-10

## 2020-07-28 ENCOUNTER — TRANSCRIPTION ENCOUNTER (OUTPATIENT)
Age: 35
End: 2020-07-28

## 2020-08-12 ENCOUNTER — TRANSCRIPTION ENCOUNTER (OUTPATIENT)
Age: 35
End: 2020-08-12

## 2020-08-20 NOTE — BRIEF OPERATIVE NOTE - NSICDXBRIEFPREOP_GEN_ALL_CORE_FT
PRE-OP DIAGNOSIS:  Ventral hernia 14-May-2019 12:35:39  Ann Shukla SSKI Counseling:  I discussed with the patient the risks of SSKI including but not limited to thyroid abnormalities, metallic taste, GI upset, fever, headache, acne, arthralgias, paraesthesias, lymphadenopathy, easy bleeding, arrhythmias, and allergic reaction.

## 2020-09-03 ENCOUNTER — TRANSCRIPTION ENCOUNTER (OUTPATIENT)
Age: 35
End: 2020-09-03

## 2020-09-17 ENCOUNTER — TRANSCRIPTION ENCOUNTER (OUTPATIENT)
Age: 35
End: 2020-09-17

## 2020-10-06 ENCOUNTER — TRANSCRIPTION ENCOUNTER (OUTPATIENT)
Age: 35
End: 2020-10-06

## 2020-11-02 ENCOUNTER — TRANSCRIPTION ENCOUNTER (OUTPATIENT)
Age: 35
End: 2020-11-02

## 2020-11-02 ENCOUNTER — NON-APPOINTMENT (OUTPATIENT)
Age: 35
End: 2020-11-02

## 2020-11-06 ENCOUNTER — TRANSCRIPTION ENCOUNTER (OUTPATIENT)
Age: 35
End: 2020-11-06

## 2020-11-17 ENCOUNTER — APPOINTMENT (OUTPATIENT)
Dept: NEUROLOGY | Facility: CLINIC | Age: 35
End: 2020-11-17
Payer: COMMERCIAL

## 2020-11-17 VITALS
SYSTOLIC BLOOD PRESSURE: 128 MMHG | WEIGHT: 195 LBS | BODY MASS INDEX: 27.92 KG/M2 | HEART RATE: 106 BPM | TEMPERATURE: 97.5 F | HEIGHT: 70 IN | DIASTOLIC BLOOD PRESSURE: 86 MMHG

## 2020-11-17 DIAGNOSIS — G47.00 INSOMNIA, UNSPECIFIED: ICD-10-CM

## 2020-11-17 DIAGNOSIS — Z87.898 PERSONAL HISTORY OF OTHER SPECIFIED CONDITIONS: ICD-10-CM

## 2020-11-17 PROCEDURE — 99214 OFFICE O/P EST MOD 30 MIN: CPT

## 2020-11-17 PROCEDURE — 99072 ADDL SUPL MATRL&STAF TM PHE: CPT

## 2020-11-17 NOTE — DATA REVIEWED
[de-identified] : MRI brain 7/17/19: Unremarkable [de-identified] : Blood tests 10/25/18: TSH 1.27 \par blood tests 7/3/19: TSH 0.6, Hb 11.9 with MCV 65, MCH 19.6, MCHC 30.2, ferritin 326, vitamin B12 404\par \par Polysomnogram 9/9/19: Mild PRASANNA with AHI of 12.5, REM latency 179.5\par MSLT 9/10/19: Man sleep latency 8.2 minutes. NO sleep onset REM periods\par \par CPAP titration report 10/7/19: effective at CPAP 7 cm H2O using a [employer] Full face mask size small

## 2020-11-17 NOTE — PHYSICAL EXAM
[FreeTextEntry1] : Examination:\par Constitutional: normal, no apparent distress\par Eyes: normal conjunctiva b/l, no ptosis, visual fields full\par Respiratory: no respiratory distress, normal effort, normal auscultation\par Cardiovascular: normal rate, rhythm, no murmurs\par Neck: supple, no masses\par Vascular: carotids normal\par Skin: normal color, no rashes\par Psych: normal mood, affect\par \par Neurological:\par Memory: normal memory, oriented to person, place, time\par Language intact/no aphasia\par Cranial Nerves: II-XII intact, Pupils equally round and reactive to light, ocular muscles/movements intact, no ptosis, no facial weakness, tongue protrudes normally in the midline, \par Motor: normal tone, no pronator drift, full strength in all four extremities in the proximal and distal muscle groups\par Coordination: Fine motor movements intact, rapid alternating movements intact, finger to nose intact bilaterally\par Sensory: intact to light touch\par DTRs: 3+ in right triceps, biceps, radialis, 2+ in left triceps, biceps, radialis, 3+ in right patellar, 2+ in left patellar, 3+ in right ankle, 2+ in left ankle,  no ankle clonus, jacobsen's negative bilaterally, plantars flexor bilaterally\par Gait: narrow based, steady\par

## 2020-11-17 NOTE — HISTORY OF PRESENT ILLNESS
[FreeTextEntry1] : I last saw Mr. Alvarez on 3/11/20.\par He has been working from home since March.\par Initially it was difficult to work at home due to distractions from his  and child.\par \par He has been having some difficulty sleeping at night for the past several weeks when he takes his Adderall. He has difficulty falling asleep and/or his sleep is fragmented.\par Without the Adderall he has difficulty staying awake.\par \par He usually takes his first dose of Adderall between 7-8 AM but sometimes as late as 10 AM.\par He takes Adderall XR 40 mg (30 + 10). He also takes a 20 mg immediate release which was added when he was going into work and finding himself dozing off in the parking lot.\par \par He does feel tired at night.\par \par He has not used CPAP since his tonsillectomy.\par \par He did not have the CT of his sinuses because of the pandemic.\par The pain has gone away.\par \par His  does still note some mild snoring since his surgery.\par \par He denies having any neck pain, extremity weakness or sensory changes.\par \par

## 2020-11-17 NOTE — DISCUSSION/SUMMARY
[FreeTextEntry1] : Arnulfo Alvarez is a 33 year old man with chronic hypersomnia.\par \par  PRASANNA - He had recent tonsillectomy.\par Will repeat Home sleep study to see if PRASANNA is still present.\par \par  Hypersomnia - He has hypersomnia despite adequately treated PRASANNA in the past.\par Repeat PSG and MSLT were not suggestive of Narcolepsy, even when PSG was done without CPAP.\par His presentation is more suggestive of idiopathic hypersomnia given his sleep inertia.\par MRI brain was unremarkable.\par Provigil was not effective in the past.\par Currenly taking a total Adderall dose of 60 mg with a short acting and long acting component.\par This is effective but he is having difficulty falling asleep at night.\par Decrease total dose by 10 mg (eliminate 10 mg long acting pill).\par Do not take Adderall after 9 AM.\par If sleep does not improve can try switching to Nuvigil or Sunosi\par \par Asymmetric Reflexes\par -Brisker on right\par -No weakness or atrophy\par -Normal MRI brain in July 2019.\par -Will continue to observe and if present on future exams will get MRI c-spine.\par \par f/u 1-2 months. Will call with results of CT scan.\par

## 2020-12-16 ENCOUNTER — APPOINTMENT (OUTPATIENT)
Dept: NEUROLOGY | Facility: CLINIC | Age: 35
End: 2020-12-16
Payer: COMMERCIAL

## 2020-12-16 PROCEDURE — 99213 OFFICE O/P EST LOW 20 MIN: CPT | Mod: 95

## 2020-12-16 NOTE — DATA REVIEWED
[de-identified] : MRI brain 7/17/19: Unremarkable [de-identified] : Blood tests 10/25/18: TSH 1.27 \par blood tests 7/3/19: TSH 0.6, Hb 11.9 with MCV 65, MCH 19.6, MCHC 30.2, ferritin 326, vitamin B12 404\par \par Polysomnogram 9/9/19: Mild PRASANNA with AHI of 12.5, REM latency 179.5\par MSLT 9/10/19: Man sleep latency 8.2 minutes. NO sleep onset REM periods\par \par CPAP titration report 10/7/19: effective at CPAP 7 cm H2O using a [employer] Full face mask size small

## 2020-12-16 NOTE — DISCUSSION/SUMMARY
[FreeTextEntry1] : Arnulfo Alvarez is a 34 year old man with chronic hypersomnia.\par \par  PRASANNA - He had recent tonsillectomy.\par Will repeat Home sleep study to see if PRASANNA is still present.\par \par  Hypersomnia - He has hypersomnia despite adequately treated PRASANNA in the past.\par Repeat PSG and MSLT were not suggestive of Narcolepsy, even when PSG was done without CPAP.\par His presentation is more suggestive of idiopathic hypersomnia given his sleep inertia.\par MRI brain was unremarkable.\par Provigil was not effective in the past.\par Currenly taking a total Adderall dose of 50 mg/day. Has difficulty falling asleep at night but difficulty staying awake during the day without it.\par Will try Sunosi.\par Decrease Adderall to 30 mg/day x 2-3 days and then 20 mg/day x 2-3 days.\par Can start Sunosi 37.5 mg x 3 days and then 75 mg/day. May need prior authorization.\par \par Can also try low dose melatonin ~ 0.5 mg starting at 4 hours before typical sleep time and advancing by 1 hour each week until 4 hours before desired sleep time.\par \par Asymmetric Reflexes\par -Today's visit was via televisit. Unable to assess. Will assess on repeat in office examinations\par -Brisker on right\par -No weakness or atrophy\par -Normal MRI brain in July 2019.\par -Will continue to observe and if present on future exams will get MRI c-spine.\par \par f/u 1-2 months, sooner if needed.\par

## 2021-01-05 ENCOUNTER — APPOINTMENT (OUTPATIENT)
Dept: NEUROLOGY | Facility: CLINIC | Age: 36
End: 2021-01-05
Payer: COMMERCIAL

## 2021-01-05 PROCEDURE — 95806 SLEEP STUDY UNATT&RESP EFFT: CPT

## 2021-01-05 PROCEDURE — 99072 ADDL SUPL MATRL&STAF TM PHE: CPT

## 2021-01-07 ENCOUNTER — TRANSCRIPTION ENCOUNTER (OUTPATIENT)
Age: 36
End: 2021-01-07

## 2021-01-08 ENCOUNTER — TRANSCRIPTION ENCOUNTER (OUTPATIENT)
Age: 36
End: 2021-01-08

## 2021-02-04 ENCOUNTER — APPOINTMENT (OUTPATIENT)
Dept: NEUROLOGY | Facility: CLINIC | Age: 36
End: 2021-02-04
Payer: COMMERCIAL

## 2021-02-04 PROCEDURE — 99213 OFFICE O/P EST LOW 20 MIN: CPT | Mod: 95

## 2021-02-04 NOTE — HISTORY OF PRESENT ILLNESS
[Home] : at home, [unfilled] , at the time of the visit. [Medical Office: ([facility])___] : at the medical office located in  [Verbal consent obtained from patient] : the patient, [unfilled] [FreeTextEntry1] : I last saw Mr. Alvarez on 12/16/20.\par \par He says that things have been fine. \par He finds that 75 mg is not as effective as Adderall. He finds that even 150 mg is not quite as effective as Adderall. However, he does find that overall it is a better medication for him. He is able to sleep better at night.\par He has been using a sleep tracking stef.\par He sleeps between 5-9 hours per night depending on his dogs, his son, etc.\par \par He has not restarted using his CPAP.\par He plans on speaking to his dentist about an oral appliance.\par He did buy a device online but it broke.\par \par He has not noticed any focal weakness, sensory changes or difficulty with walking.

## 2021-02-04 NOTE — DATA REVIEWED
[de-identified] : MRI brain 7/17/19: Unremarkable [de-identified] : Blood tests 10/25/18: TSH 1.27 \par blood tests 7/3/19: TSH 0.6, Hb 11.9 with MCV 65, MCH 19.6, MCHC 30.2, ferritin 326, vitamin B12 404\par \par Polysomnogram 9/9/19: Mild PRASANNA with AHI of 12.5, REM latency 179.5\par MSLT 9/10/19: Man sleep latency 8.2 minutes. NO sleep onset REM periods\par \par CPAP titration report 10/7/19: effective at CPAP 7 cm H2O using a Simplus Full face mask size small\par \par Home sleep study 1/5/21: AHI 9.9. Supine AHI 19.9. Non-supine AHI 3.3

## 2021-02-04 NOTE — DISCUSSION/SUMMARY
[FreeTextEntry1] : Arnulfo Alvarez is a 35  year old man with chronic hypersomnia.\par \par PRASANNA - He had recent tonsillectomy.\par Repeat HST shows mild PRASANNA. However, this is more significant in the supine position and he does not typically sleep in the supine position.\par He is interested in an oral appliance. I suggest that he see [facility].\par \par  Hypersomnia - He has hypersomnia despite adequately treated PRASANNA in the past.\par Repeat PSG and MSLT were not suggestive of Narcolepsy, even when PSG was done without CPAP.\par His presentation is more suggestive of idiopathic hypersomnia given his sleep inertia.\par MRI brain was unremarkable.\par Provigil was not effective in the past.\par Adderall was effective but he had adverse effects (difficulty sleeping).\par He is having fairly good results with Sunosi. Continue 150 mg/day.\par \par Asymmetric Reflexes\par -Today's visit was via televisit. Unable to assess. Will assess on repeat in office examinations\par -Brisker on right\par -No weakness or atrophy\par -Normal MRI brain in July 2019.\par -Will continue to observe and if present on future exams will get MRI c-spine.\par \par f/u 3 months, sooner if needed.\par \par I gave him referrals for pediatric sleep specialists for his son.\par

## 2021-04-08 NOTE — ED ADULT NURSE NOTE - GASTROINTESTINAL ASSESSMENT
- - - Odomzo Counseling- I discussed with the patient the risks of Odomzo including but not limited to nausea, vomiting, diarrhea, constipation, weight loss, changes in the sense of taste, decreased appetite, muscle spasms, and hair loss.  The patient verbalized understanding of the proper use and possible adverse effects of Odomzo.  All of the patient's questions and concerns were addressed.

## 2021-05-11 ENCOUNTER — APPOINTMENT (OUTPATIENT)
Dept: NEUROLOGY | Facility: CLINIC | Age: 36
End: 2021-05-11
Payer: COMMERCIAL

## 2021-05-11 VITALS
DIASTOLIC BLOOD PRESSURE: 82 MMHG | SYSTOLIC BLOOD PRESSURE: 128 MMHG | BODY MASS INDEX: 28.63 KG/M2 | HEIGHT: 70 IN | HEART RATE: 81 BPM | WEIGHT: 200 LBS | TEMPERATURE: 97.2 F

## 2021-05-11 DIAGNOSIS — F90.9 ATTENTION-DEFICIT HYPERACTIVITY DISORDER, UNSPECIFIED TYPE: ICD-10-CM

## 2021-05-11 PROCEDURE — 99213 OFFICE O/P EST LOW 20 MIN: CPT

## 2021-05-11 PROCEDURE — 99072 ADDL SUPL MATRL&STAF TM PHE: CPT

## 2021-05-11 NOTE — HISTORY OF PRESENT ILLNESS
[FreeTextEntry1] : Mr. Alvarez was last seen on 2/4/21.\par \par His sleep tracker shows that he sleeps for about 6.5-7 hours per night despite allowing for 8 hours per night. He is not aware of being up and feels as if he sleeps for 8 hours.\par He is typically not well rested upon awakening.\par \par His  only notes loud snoring once in a blue moon.\par He has not yet seen the dentist for the oral appliance.\par He received his 2nd vaccine 10 days ago and will start to go for more appointments.\par \par He finds the Sunosi to work well.\par He is without the medication for a few days because the pharmacy had to order it.\par He is aware of how well it works after going for a few days without it.\par He finds that it is about 80% as effective as Adderall.\par He would like to go back to Adderall. He thinks that maybe his issues with insomnia may have been temporary.\par \par He denies headaches or neck pain.\par

## 2021-05-11 NOTE — CONSULT LETTER
[Dear  ___] : Dear  [unfilled], [Consult Letter:] : I had the pleasure of evaluating your patient, [unfilled]. [Please see my note below.] : Please see my note below. [Consult Closing:] : Thank you very much for allowing me to participate in the care of this patient.  If you have any questions, please do not hesitate to contact me. [FreeTextEntry2] : Edward Milian [FreeTextEntry3] : Sincerely,\par \par \par Chikis Chase MD\par Diplomate, American Academy of Psychiatry and Neurology\par Board Certified in the Subspecialty of Clinical Neurophysiology\par Board Certified in the Subspecialty of Sleep Medicine\par Board Certified in the Subspecialty of Epilepsy\par

## 2021-05-11 NOTE — DISCUSSION/SUMMARY
[FreeTextEntry1] : Arnulfo Alvarez is a 35 year old man with chronic hypersomnia.\par \par PRASANNA - He had recent tonsillectomy.\par Repeat HST shows mild PRASANNA. 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 He does have concerns about becoming dependent on Adderall.\par -Can continue Sunosi 150 mg/day\par -Can use Adderall ER 30 mg instead of Sunosi on particularly hard days when he has to be more alert. it is possible that insurance will not cover both at once, but will try. He understands that he has to take one or the other.\par \par Asymmetric Reflexes\par -Brisker knee reflex on right.\par -No weakness or atrophy\par -No pathologic reflexes such as Campos's or Babinski's\par -Normal MRI brain in July 2019.\par -No neck or back pain.\par -Will continue to observe.\par \par f/u 3-4 months, sooner if needed.\par \par

## 2021-05-11 NOTE — PHYSICAL EXAM
[FreeTextEntry1] : Examination:\par Constitutional: normal, no apparent distress\par Eyes: normal conjunctiva b/l, no ptosis, visual fields full\par Respiratory: no respiratory distress, normal effort, normal auscultation\par Cardiovascular: normal rate, rhythm, no murmurs\par Neck: supple, no masses\par Vascular: carotids normal\par Skin: normal color, no rashes\par Psych: normal mood, affect\par \par Neurological:\par Memory: normal memory, oriented to person, place, time\par Language intact/no aphasia\par Cranial Nerves: II-XII intact, Pupils equally round and reactive to light, ocular muscles/movements intact, no ptosis, no facial weakness, tongue protrudes normally in the midline, \par Motor: normal tone, no pronator drift, full strength in all four extremities in the proximal and distal muscle groups\par Coordination: Fine motor movements intact, rapid alternating movements intact, finger to nose intact bilaterally\par Sensory: intact to light touch\par DTRs:1+ in b/l biceps, radialis, 3+ in b/l patellars, slightly more brisk on right, 2+ in b/l ankles, Negative Campos's bilaterally, Plantars flexor, no clonus at ankles bilaterally\par Gait: narrow based, steady\par spine: no tenderness with palpation\par

## 2021-05-11 NOTE — DATA REVIEWED
[de-identified] : MRI brain 7/17/19: Unremarkable [de-identified] : Blood tests 10/25/18: TSH 1.27 \par blood tests 7/3/19: TSH 0.6, Hb 11.9 with MCV 65, MCH 19.6, MCHC 30.2, ferritin 326, vitamin B12 404\par \par Polysomnogram 9/9/19: Mild PRASANNA with AHI of 12.5, REM latency 179.5\par MSLT 9/10/19: Man sleep latency 8.2 minutes. NO sleep onset REM periods\par \par CPAP titration report 10/7/19: effective at CPAP 7 cm H2O using a Simplus Full face mask size small\par \par Home sleep study 1/5/21: AHI 9.9. Supine AHI 19.9. Non-supine AHI 3.3

## 2021-06-30 ENCOUNTER — RX RENEWAL (OUTPATIENT)
Age: 36
End: 2021-06-30

## 2021-07-28 ENCOUNTER — APPOINTMENT (OUTPATIENT)
Dept: NEUROLOGY | Facility: CLINIC | Age: 36
End: 2021-07-28
Payer: COMMERCIAL

## 2021-08-02 ENCOUNTER — APPOINTMENT (OUTPATIENT)
Dept: NEUROLOGY | Facility: CLINIC | Age: 36
End: 2021-08-02
Payer: COMMERCIAL

## 2021-08-02 VITALS
HEART RATE: 79 BPM | DIASTOLIC BLOOD PRESSURE: 75 MMHG | BODY MASS INDEX: 30.06 KG/M2 | SYSTOLIC BLOOD PRESSURE: 122 MMHG | TEMPERATURE: 97.2 F | HEIGHT: 70 IN | WEIGHT: 210 LBS

## 2021-08-02 PROCEDURE — 99213 OFFICE O/P EST LOW 20 MIN: CPT

## 2021-08-02 NOTE — PHYSICAL EXAM
[FreeTextEntry1] : Examination:\par Constitutional: normal, no apparent distress\par Eyes: normal conjunctiva b/l, no ptosis, visual fields full\par Respiratory: no respiratory distress, normal effort, normal auscultation\par Cardiovascular: normal rate, rhythm, no murmurs\par Neck: supple, no masses\par Vascular: carotids normal\par Skin: normal color, no rashes\par Psych: normal mood, affect\par \par Neurological:\par Memory: normal memory, oriented to person, place, time\par Language intact/no aphasia\par Cranial Nerves: II-XII intact, Pupils equally round and reactive to light, ocular muscles/movements intact, no ptosis, no facial weakness, tongue protrudes normally in the midline, \par Motor: normal tone, no pronator drift, full strength in all four extremities in the proximal and distal muscle groups\par Coordination: Fine motor movements intact, rapid alternating movements intact, finger to nose intact bilaterally\par Sensory: intact to light touch\par DTRs: 2+ in b/l radialis, biceps, patellars, ankle jerks, negative Campos's b/l, no ankle clonus\par Gait: narrow based, steady\par

## 2021-08-02 NOTE — HISTORY OF PRESENT ILLNESS
[FreeTextEntry1] : I last saw Mr. Alvarez on 5/11/21.\par \par He lost some of his Sunosi pills so he went back to taking Adderall and is not finding that he is having problems with sleep at this time.\par He still finds that it works better than Sunosi. It has been the most effective out of the medications that he has tried for hypersomnia. He takes Adderall ER 30 mg.\par He allows himself 8-9 hours to sleep.\par His sleep tracker shows that he is sleeping for 6-7 hours.\par \par His  notes snoring every now and then.\par He saw his dentist but has not spoken to him about the oral appliance.\par he avoids sleeping on his back. \par \par He has been having some neck pain which radiates into the left shoulder.

## 2021-08-02 NOTE — DISCUSSION/SUMMARY
[FreeTextEntry1] : Arnulfo Alvarez is a 35 year old man with chronic hypersomnia.\par \par PRASANNA - He had recent tonsillectomy.\par Repeat HST shows mild PRASANNA. However, this is more significant in the supine position and he does not typically sleep in the supine position.\par He will f/u with dentist regarding an oral appliance.\par \par  Hypersomnia - He has hypersomnia despite adequately treated PRASANNA in the past.\par Repeat PSG and MSLT were not suggestive of Narcolepsy, even when PSG was done without CPAP.\par His presentation is more suggestive of idiopathic hypersomnia given his sleep inertia.\par MRI brain was unremarkable.\par Provigil was not effective in the past.\par Adderall was effective but he had some difficulty sleeping previously.\par However, now he is taking Adderall ER 30 mg with good effect during the day and no side effects.\par Will renew this rx.\par \par Asymmetric Reflexes\par -Brisker knee reflex on right in past. Reflexes seem symmetric at this time. \par -No weakness or atrophy\par -No pathologic reflexes such as Campos's or Babinski's\par -Normal MRI brain in July 2019.\par \par \par f/u 4-6 months, sooner if needed.\par

## 2021-08-02 NOTE — DATA REVIEWED
[de-identified] : MRI brain 7/17/19: Unremarkable [de-identified] : Blood tests 10/25/18: TSH 1.27 \par blood tests 7/3/19: TSH 0.6, Hb 11.9 with MCV 65, MCH 19.6, MCHC 30.2, ferritin 326, vitamin B12 404\par \par Polysomnogram 9/9/19: Mild PRASANNA with AHI of 12.5, REM latency 179.5\par MSLT 9/10/19: Man sleep latency 8.2 minutes. NO sleep onset REM periods\par \par CPAP titration report 10/7/19: effective at CPAP 7 cm H2O using a Simplus Full face mask size small\par \par Home sleep study 1/5/21: AHI 9.9. Supine AHI 19.9. Non-supine AHI 3.3

## 2021-10-22 ENCOUNTER — TRANSCRIPTION ENCOUNTER (OUTPATIENT)
Age: 36
End: 2021-10-22

## 2021-11-27 NOTE — ED BEHAVIORAL HEALTH ASSESSMENT NOTE - NS ED BHA PLAN TR REFERRANT YN
[facility]  [address] [place] [id number]  Phone: [phone]  Fax: [phone]  Follow Up Time: 1-3 Days  
Yes

## 2021-12-08 ENCOUNTER — TRANSCRIPTION ENCOUNTER (OUTPATIENT)
Age: 36
End: 2021-12-08

## 2021-12-15 ENCOUNTER — APPOINTMENT (OUTPATIENT)
Dept: NEUROLOGY | Facility: CLINIC | Age: 36
End: 2021-12-15
Payer: COMMERCIAL

## 2021-12-16 ENCOUNTER — APPOINTMENT (OUTPATIENT)
Dept: NEUROLOGY | Facility: CLINIC | Age: 36
End: 2021-12-16
Payer: COMMERCIAL

## 2021-12-16 PROCEDURE — 99213 OFFICE O/P EST LOW 20 MIN: CPT | Mod: 95

## 2021-12-16 NOTE — DISCUSSION/SUMMARY
[FreeTextEntry1] : Arnulfo Alvarez is a 35 year old man with chronic hypersomnia.\par \par PRASANNA - He had recent tonsillectomy.\par Repeat HST showed mild PRASANNA. However, this is more significant in the supine position and he does not typically sleep in the supine position.\par \par We discussed the CPAP recall.\par The [employer] station recall is a proactive recall, meaning that the company is calling for the recall despite a very low percentage of issues.\par At this time it is unclear how the company will be handling replacements. \par Advised to call  [phone] for more information and to register the machine for replacement.\par \par The choice regarding whether to continue to use the machine is based on a risk vs benefit assessment.\par If there is a clinical benefit with use of CPAP/APAP or BPAP and the patient is not using any of the products that increase the risk of a problem with this machine, it is likely relatively safe to continue use.\par \par He should not use SoClean.\par \par He will call his insurance company to ask about coverage for an oral appliance.\par \par  Hypersomnia - He has hypersomnia despite adequately treated PRASANNA in the past.\par Repeat PSG and MSLT were not suggestive of Narcolepsy, even when PSG was done without CPAP.\par His presentation is more suggestive of idiopathic hypersomnia given his sleep inertia.\par MRI brain was unremarkable.\par Provigil was not effective in the past.\par Adderall was effective but he had some difficulty sleeping previously.\par However, now he is taking Adderall ER 30 mg with good effect during the day and no side effects.\par Rx was renewed last week.\par \par Asymmetric Reflexes\par -Brisker knee reflex on right in past. Reflexes were symmetric at the time of the last visit, could not test during this teleheatth visit.\par -No weakness or atrophy\par -No pathologic reflexes such as Campos's or Babinski's\par -Normal MRI brain in July 2019.\par \par \par f/u 4-6 months, sooner if needed.\par

## 2021-12-16 NOTE — PHYSICAL EXAM
DISCHARGE SUMMARY



DATE OF DISCHARGE:

01/16/2019



DISCHARGE MEDICINES:

1. Guaifenesin oral solution 200 mg p.o. q.4 hours p.r.n. for cough.

2. Claritin 10 mg daily.

3. Pred Forte 1% 2 drops both eyes q.h.s.

4. Vitamin D 99210 units every month.

5. Milk of magnesia 2400 mg p.r.n. for constipation.

6. Iron sulfate 325 mg p.o. b.i.d.

7. Xalatan 0.005% 1 drop left eye q.h.s.

8. Diamox 250 on Tuesdays.

9. Eliquis 2.5 b.i.d.

10.Sensipar 30 mg daily.

11.DuoNeb updraft q.i.d.

12.Cardizem  mg daily.

13.Ensure 1 can with meals t.i.d.

14.Lasix 40 mg daily.

15.Mirtazapine 7.5 mg q.h.s.

16.Sodium bicarb 650 p.o. b.i.d.

17.IV Invanz 1 g daily for another 7 days.



DISCHARGE DIAGNOSES:

1. Acute kidney injury.

2. Atrial fibrillation, rapid ventricular response.

3. Congestive heart failure exacerbation.

4. Urinary tract infection.

5. Hypothyroidism.

6. Glaucoma.

7. Hypertension.

8. Anemia.

9. Chronic renal disease stage III.

10.Diabetes mellitus.



CONDITION:

Stable.



PROGNOSIS:

Guarded.



An 83-year-old white female came in with acute tubular necrosis, acute renal

insufficiency, prerenal renal failure, ESBL E coli UTI and possible pneumonia, was

given IV antibiotics.  She was found to have ESBL E coli UTI for which pneumonia Invanz

was continued for another 7 days at the nursing home.  Continue on current medications.

We are giving her no Norco or no Butrans while in the hospital.  She did not require

any pain medicine. We are just giving her Xanax 0.25 q.h.s.  The patient was stable.

Please see further orders.





MMODL / IJN: [id number] / Job#: [id number] [FreeTextEntry1] : Examination:\par Patient is well appearing\par Neurological Examination:\par Mental Status: Awake, alert. Oriented to person, place, time\par Language: No aphasia\par Cranial Nerves:\par  EOMI, No facial weakness, tongue protrudes in the midline\par Motor Exam: No pronator drift. Barrel roll intact. Fine motor movements intact in hands\par Sensory: intact on self exam\par Reflexes: Unable to examine\par Cerebellar: Finger to nose intact bilaterally\par \par \par

## 2021-12-16 NOTE — DATA REVIEWED
[de-identified] : MRI brain 7/17/19: Unremarkable [de-identified] : Blood tests 10/25/18: TSH 1.27 \par blood tests 7/3/19: TSH 0.6, Hb 11.9 with MCV 65, MCH 19.6, MCHC 30.2, ferritin 326, vitamin B12 404\par \par Polysomnogram 9/9/19: Mild PRASANNA with AHI of 12.5, REM latency 179.5\par MSLT 9/10/19: Man sleep latency 8.2 minutes. NO sleep onset REM periods\par \par CPAP titration report 10/7/19: effective at CPAP 7 cm H2O using a Simplus Full face mask size small\par \par Home sleep study 1/5/21: AHI 9.9. Supine AHI 19.9. Non-supine AHI 3.3

## 2021-12-16 NOTE — HISTORY OF PRESENT ILLNESS
[Home] : at home, [unfilled] , at the time of the visit. [Medical Office: ([facility])___] : at the medical office located in  [FreeTextEntry1] : Last visit 8/2/21.\par He has an upper respiratory infection and changed his appointment to telehealth.\par His  reported that he was snoring again more recently. He witnessed loud snoring.\par He restarted using his CPAP machine but then was made aware of the recall and stopped using it.\par He had been using a SoClean machine.\par He believes that he is mostly sleeping on his side.\par He called [facility] about an oral appliance and was told that an appliance would not be covered by his insurance.\par \par His sleep tracker repots 5.5-6 hours of sleep per night, despite being in bed for 8-9 hours. He does not find himself being awake for prolonged periods on most nights. \par \par He does find the Adderall ER 30 mg to be effective.\par He filled a prior prescription for Sunosi but does not take it. He is saving it as a back up in case of a problem.\par \par

## 2022-01-11 NOTE — ED STATDOCS - NS_EDPROVIDERDISPOUSERTYPE_ED_A_ED
Anesthesia Post Evaluation    Patient: Erickson Mota    Procedure(s) Performed: Procedure(s) (LRB):  BLEPHARORRHAPHY  ** Secondary Ocular Implant after Evisceration   (Right)  INSERTION, ORBITAL IMPLANT (Right)    Final Anesthesia Type: general      Patient location during evaluation: PACU  Patient participation: Yes- Able to Participate  Level of consciousness: awake and alert and oriented  Pain management: adequate  Airway patency: patent    PONV status at discharge: No PONV  Anesthetic complications: no      Cardiovascular status: blood pressure returned to baseline and hemodynamically stable  Respiratory status: unassisted  Hydration status: euvolemic  Follow-up not needed.          Vitals Value Taken Time   /57 01/07/22 1645   Temp 36.7 °C (98 °F) 01/07/22 1535   Pulse 77 01/07/22 1645   Resp 20 01/07/22 1535   SpO2 94 % 01/07/22 1630         No case tracking events are documented in the log.      Pain/Dorian Score: No data recorded       Scribe Attestation (For Scribes USE Only)... Scribe Attestation (For Scribes USE Only).../Attending Attestation (For Attendings USE Only)...

## 2022-01-24 ENCOUNTER — TRANSCRIPTION ENCOUNTER (OUTPATIENT)
Age: 37
End: 2022-01-24

## 2022-03-01 ENCOUNTER — NON-APPOINTMENT (OUTPATIENT)
Age: 37
End: 2022-03-01

## 2022-04-15 ENCOUNTER — TRANSCRIPTION ENCOUNTER (OUTPATIENT)
Age: 37
End: 2022-04-15

## 2022-05-20 NOTE — ED PROVIDER NOTE - PRINCIPAL DIAGNOSIS
MEDICATIONS  (STANDING):  amLODIPine   Tablet 10 milliGRAM(s) Oral daily  atorvastatin 20 milliGRAM(s) Oral at bedtime  buPROPion XL (24-Hour) . 450 milliGRAM(s) Oral daily  enoxaparin Injectable 40 milliGRAM(s) SubCutaneous every 24 hours  FLUoxetine 10 milliGRAM(s) Oral daily  pantoprazole    Tablet 40 milliGRAM(s) Oral before breakfast  QUEtiapine 75 milliGRAM(s) Oral at bedtime    MEDICATIONS  (PRN):  acetaminophen     Tablet .. 650 milliGRAM(s) Oral every 6 hours PRN Temp greater or equal to 38C (100.4F), Mild Pain (1 - 3)  melatonin 3 milliGRAM(s) Oral at bedtime PRN Insomnia  
Anxiety
MEDICATIONS  (STANDING):  amLODIPine   Tablet 10 milliGRAM(s) Oral daily  atorvastatin 20 milliGRAM(s) Oral at bedtime  buPROPion XL (24-Hour) . 450 milliGRAM(s) Oral daily  enoxaparin Injectable 40 milliGRAM(s) SubCutaneous every 24 hours  FLUoxetine 10 milliGRAM(s) Oral daily  pantoprazole    Tablet 40 milliGRAM(s) Oral before breakfast  QUEtiapine 75 milliGRAM(s) Oral at bedtime    MEDICATIONS  (PRN):  acetaminophen     Tablet .. 650 milliGRAM(s) Oral every 6 hours PRN Temp greater or equal to 38C (100.4F), Mild Pain (1 - 3)  melatonin 3 milliGRAM(s) Oral at bedtime PRN Insomnia

## 2022-06-06 ENCOUNTER — TRANSCRIPTION ENCOUNTER (OUTPATIENT)
Age: 37
End: 2022-06-06

## 2022-06-27 ENCOUNTER — APPOINTMENT (OUTPATIENT)
Dept: NEUROLOGY | Facility: CLINIC | Age: 37
End: 2022-06-27
Payer: COMMERCIAL

## 2022-06-27 VITALS
HEIGHT: 70 IN | WEIGHT: 215 LBS | HEART RATE: 98 BPM | SYSTOLIC BLOOD PRESSURE: 138 MMHG | DIASTOLIC BLOOD PRESSURE: 82 MMHG | BODY MASS INDEX: 30.78 KG/M2 | TEMPERATURE: 97.8 F

## 2022-06-27 PROCEDURE — 99214 OFFICE O/P EST MOD 30 MIN: CPT

## 2022-06-27 RX ORDER — DEXTROAMPHETAMINE SACCHARATE, AMPHETAMINE ASPARTATE MONOHYDRATE, DEXTROAMPHETAMINE SULFATE AND AMPHETAMINE SULFATE 2.5; 2.5; 2.5; 2.5 MG/1; MG/1; MG/1; MG/1
10 CAPSULE, EXTENDED RELEASE ORAL
Qty: 30 | Refills: 0 | Status: DISCONTINUED | COMMUNITY
Start: 2019-11-26 | End: 2022-06-27

## 2022-06-27 NOTE — HISTORY OF PRESENT ILLNESS
[FreeTextEntry1] : Last visit 12/16/21.\par \par He has noticed some mild tingling and numbness in his left hand to his elbow.\par When he does not take Adderall and sleeps more he notes that this increases in intensity.\par Sometimes he notes this in his right arm as well and in his forearm.\par At times the tingling is more prevalent in the left 3rd and 4th digits.\par He denies weakness.\par He denies neck pain.\par \par He has been periodically not taking Adderall because he again is noticing that he has difficulty sleeping at night.\par \par He is averaging 4 hours of sleep on nights that he takes Adderall. He takes it between 8-9 AM.\par He goes to bed between 10-10:30 PM. He has difficulty initiating sleep. Once he falls asleep he can stay asleep.\par He is under stress. He feels anxious at night.\par If he does not take Adderall he can fall asleep very quickly but will over sleep.\par \par He did receive a replacement CPAP machine.\par He finds it difficult to use since it has been a long time without use.

## 2022-06-27 NOTE — DISCUSSION/SUMMARY
[FreeTextEntry1] : Arnulfo Alvarez is a 36 year old man with chronic hypersomnia.\par \par PRASANNA - He is s/p tonsillectomy.\par Repeat HST showed mild PRASANNA. However, this is more significant in the supine position and he does not typically sleep in the supine position.\par He has received a replacement machine but has not been compliant.\par Advised to start use of CPAP again and gradually increase use after desensitization.\par \par  Hypersomnia - He has hypersomnia despite adequately treated PRASANNA in the past.\par Repeat PSG and MSLT were not suggestive of Narcolepsy, even when PSG was done without CPAP.\par His presentation is more suggestive of idiopathic hypersomnia given his sleep inertia.\par MRI brain was unremarkable.\par Provigil was not effective in the past.\par Adderall was effective but has had some difficulty sleeping previously.\par Trial of changing dosage to Adderall ER 20 mg with 10 mg immediate release. My hope is that this will help to take effect more quickly (which is a complaint he has - too slow to work) but may not linger as much into the night.\par \par Paresthesias\par -Upper extremity paresthesias\par -Brisk reflexes on exam\par -Will check MRI cervical and thoracic spine\par -TSH, vitamin B12, vitamin B6\par -Normal MRI brain in July 2019.\par \par \par f/u 4-6 months, sooner if needed.\par  \par \par

## 2022-06-27 NOTE — PHYSICAL EXAM
[FreeTextEntry1] : Examination:\par Constitutional: normal, no apparent distress\par Eyes: normal conjunctiva b/l, no ptosis, visual fields full\par Respiratory: no respiratory distress, normal effort, normal auscultation\par Cardiovascular: normal rate, rhythm, no murmurs\par Neck: supple, no masses\par Vascular: carotids normal\par Skin: normal color, no rashes\par Psych: normal mood, affect\par \par Neurological:\par Memory: normal memory, oriented to person, place, time\par Language intact/no aphasia\par Cranial Nerves: II-XII intact, Pupils equally round and reactive to light, ocular muscles/movements intact, no ptosis, no facial weakness, tongue protrudes normally in the midline, \par Motor: normal tone, no pronator drift, full strength in all four extremities in the proximal and distal muscle groups\par Coordination: Fine motor movements intact, rapid alternating movements intact, finger to nose intact bilaterally\par Sensory: intact to light touch\par DTRs: symmetric, 1+ in b/l triceps, 2+ in b/l biceps, 2+ in b/l brachioradialis, 3+ in bilateral patellars, 2+ in bilateral Achilles\par Gait: narrow based, steady\par \par

## 2022-06-27 NOTE — DATA REVIEWED
[de-identified] : MRI brain 7/17/19: Unremarkable [de-identified] : Blood tests 10/25/18: TSH 1.27 \par blood tests 7/3/19: TSH 0.6, Hb 11.9 with MCV 65, MCH 19.6, MCHC 30.2, ferritin 326, vitamin B12 404\par \par Polysomnogram 9/9/19: Mild PRASANNA with AHI of 12.5, REM latency 179.5\par MSLT 9/10/19: Man sleep latency 8.2 minutes. NO sleep onset REM periods\par \par CPAP titration report 10/7/19: effective at CPAP 7 cm H2O using a Simplus Full face mask size small\par \par Home sleep study 1/5/21: AHI 9.9. Supine AHI 19.9. Non-supine AHI 3.3

## 2022-07-20 NOTE — ED BEHAVIORAL HEALTH ASSESSMENT NOTE - ORIENTED TO PERSON
LENS OPTION (STANDARD): Discussed with patient in detail all available methods and lens options as well as their associated benefits, limitations and out-of-pocket costs. Patient chooses standard cataract surgery with monofocal lens implant and understands that reading glasses will still be needed after surgery. The patient also understands that with any IOL there is no guarantee that they will not require glasses to see their best at any distance after surgery. The risks, benefits and alternatives to surgery were explained and all questions were answered. Yes

## 2022-08-08 ENCOUNTER — APPOINTMENT (OUTPATIENT)
Dept: MRI IMAGING | Facility: CLINIC | Age: 37
End: 2022-08-08

## 2022-08-08 ENCOUNTER — OUTPATIENT (OUTPATIENT)
Dept: OUTPATIENT SERVICES | Facility: HOSPITAL | Age: 37
LOS: 1 days | End: 2022-08-08
Payer: COMMERCIAL

## 2022-08-08 DIAGNOSIS — R20.2 PARESTHESIA OF SKIN: ICD-10-CM

## 2022-08-08 DIAGNOSIS — Z98.890 OTHER SPECIFIED POSTPROCEDURAL STATES: Chronic | ICD-10-CM

## 2022-08-08 PROCEDURE — 72141 MRI NECK SPINE W/O DYE: CPT | Mod: 26

## 2022-08-08 PROCEDURE — 72146 MRI CHEST SPINE W/O DYE: CPT | Mod: 26

## 2022-08-08 PROCEDURE — 72146 MRI CHEST SPINE W/O DYE: CPT

## 2022-08-08 PROCEDURE — 72141 MRI NECK SPINE W/O DYE: CPT

## 2022-08-16 ENCOUNTER — NON-APPOINTMENT (OUTPATIENT)
Age: 37
End: 2022-08-16

## 2022-10-24 ENCOUNTER — TRANSCRIPTION ENCOUNTER (OUTPATIENT)
Age: 37
End: 2022-10-24

## 2022-10-25 ENCOUNTER — TRANSCRIPTION ENCOUNTER (OUTPATIENT)
Age: 37
End: 2022-10-25

## 2022-12-05 ENCOUNTER — TRANSCRIPTION ENCOUNTER (OUTPATIENT)
Age: 37
End: 2022-12-05

## 2022-12-13 ENCOUNTER — EMERGENCY (EMERGENCY)
Facility: HOSPITAL | Age: 37
LOS: 0 days | Discharge: HOME | End: 2022-12-13
Attending: EMERGENCY MEDICINE
Payer: COMMERCIAL

## 2022-12-13 VITALS
DIASTOLIC BLOOD PRESSURE: 89 MMHG | SYSTOLIC BLOOD PRESSURE: 150 MMHG | OXYGEN SATURATION: 100 % | HEART RATE: 80 BPM | TEMPERATURE: 98 F | RESPIRATION RATE: 18 BRPM

## 2022-12-13 VITALS — WEIGHT: 214.95 LBS | HEIGHT: 70 IN

## 2022-12-13 DIAGNOSIS — Z98.890 OTHER SPECIFIED POSTPROCEDURAL STATES: Chronic | ICD-10-CM

## 2022-12-13 DIAGNOSIS — E29.1 TESTICULAR HYPOFUNCTION: ICD-10-CM

## 2022-12-13 DIAGNOSIS — F31.70 BIPOLAR DISORDER, CURRENTLY IN REMISSION, MOST RECENT EPISODE UNSPECIFIED: ICD-10-CM

## 2022-12-13 DIAGNOSIS — F41.9 ANXIETY DISORDER, UNSPECIFIED: ICD-10-CM

## 2022-12-13 DIAGNOSIS — R45.851 SUICIDAL IDEATIONS: ICD-10-CM

## 2022-12-13 DIAGNOSIS — D56.9 THALASSEMIA, UNSPECIFIED: ICD-10-CM

## 2022-12-13 DIAGNOSIS — K44.9 DIAPHRAGMATIC HERNIA WITHOUT OBSTRUCTION OR GANGRENE: ICD-10-CM

## 2022-12-13 DIAGNOSIS — G47.33 OBSTRUCTIVE SLEEP APNEA (ADULT) (PEDIATRIC): ICD-10-CM

## 2022-12-13 DIAGNOSIS — K80.20 CALCULUS OF GALLBLADDER WITHOUT CHOLECYSTITIS WITHOUT OBSTRUCTION: ICD-10-CM

## 2022-12-13 DIAGNOSIS — G47.30 SLEEP APNEA, UNSPECIFIED: ICD-10-CM

## 2022-12-13 DIAGNOSIS — K21.9 GASTRO-ESOPHAGEAL REFLUX DISEASE WITHOUT ESOPHAGITIS: ICD-10-CM

## 2022-12-13 DIAGNOSIS — K76.0 FATTY (CHANGE OF) LIVER, NOT ELSEWHERE CLASSIFIED: ICD-10-CM

## 2022-12-13 DIAGNOSIS — F60.3 BORDERLINE PERSONALITY DISORDER: ICD-10-CM

## 2022-12-13 DIAGNOSIS — Z20.822 CONTACT WITH AND (SUSPECTED) EXPOSURE TO COVID-19: ICD-10-CM

## 2022-12-13 LAB
ALBUMIN SERPL ELPH-MCNC: 4.3 G/DL — SIGNIFICANT CHANGE UP (ref 3.3–5)
ALP SERPL-CCNC: 128 U/L — HIGH (ref 40–120)
ALT FLD-CCNC: 205 U/L — HIGH (ref 12–78)
ANION GAP SERPL CALC-SCNC: 5 MMOL/L — SIGNIFICANT CHANGE UP (ref 5–17)
APAP SERPL-MCNC: < 2 UG/ML (ref 10–30)
APPEARANCE UR: CLEAR — SIGNIFICANT CHANGE UP
AST SERPL-CCNC: 184 U/L — HIGH (ref 15–37)
BASOPHILS # BLD AUTO: 0.03 K/UL — SIGNIFICANT CHANGE UP (ref 0–0.2)
BASOPHILS NFR BLD AUTO: 0.4 % — SIGNIFICANT CHANGE UP (ref 0–2)
BILIRUB SERPL-MCNC: 1.2 MG/DL — SIGNIFICANT CHANGE UP (ref 0.2–1.2)
BILIRUB UR-MCNC: NEGATIVE — SIGNIFICANT CHANGE UP
BUN SERPL-MCNC: 13 MG/DL — SIGNIFICANT CHANGE UP (ref 7–23)
CALCIUM SERPL-MCNC: 9.8 MG/DL — SIGNIFICANT CHANGE UP (ref 8.5–10.1)
CHLORIDE SERPL-SCNC: 105 MMOL/L — SIGNIFICANT CHANGE UP (ref 96–108)
CO2 SERPL-SCNC: 29 MMOL/L — SIGNIFICANT CHANGE UP (ref 22–31)
COLOR SPEC: YELLOW — SIGNIFICANT CHANGE UP
CREAT SERPL-MCNC: 0.79 MG/DL — SIGNIFICANT CHANGE UP (ref 0.5–1.3)
DIFF PNL FLD: NEGATIVE — SIGNIFICANT CHANGE UP
EGFR: 120 ML/MIN/1.73M2 — SIGNIFICANT CHANGE UP
EOSINOPHIL # BLD AUTO: 0.17 K/UL — SIGNIFICANT CHANGE UP (ref 0–0.5)
EOSINOPHIL NFR BLD AUTO: 2.2 % — SIGNIFICANT CHANGE UP (ref 0–6)
ETHANOL SERPL-MCNC: <10 MG/DL — SIGNIFICANT CHANGE UP (ref 0–10)
FLUAV AG NPH QL: SIGNIFICANT CHANGE UP
FLUBV AG NPH QL: SIGNIFICANT CHANGE UP
GLUCOSE SERPL-MCNC: 93 MG/DL — SIGNIFICANT CHANGE UP (ref 70–99)
GLUCOSE UR QL: NEGATIVE — SIGNIFICANT CHANGE UP
HCT VFR BLD CALC: 39.8 % — SIGNIFICANT CHANGE UP (ref 39–50)
HGB BLD-MCNC: 12.4 G/DL — LOW (ref 13–17)
IMM GRANULOCYTES NFR BLD AUTO: 0.7 % — SIGNIFICANT CHANGE UP (ref 0–0.9)
KETONES UR-MCNC: NEGATIVE — SIGNIFICANT CHANGE UP
LEUKOCYTE ESTERASE UR-ACNC: ABNORMAL
LYMPHOCYTES # BLD AUTO: 2.84 K/UL — SIGNIFICANT CHANGE UP (ref 1–3.3)
LYMPHOCYTES # BLD AUTO: 37.4 % — SIGNIFICANT CHANGE UP (ref 13–44)
MCHC RBC-ENTMCNC: 20.5 PG — LOW (ref 27–34)
MCHC RBC-ENTMCNC: 31.2 GM/DL — LOW (ref 32–36)
MCV RBC AUTO: 65.9 FL — LOW (ref 80–100)
MONOCYTES # BLD AUTO: 0.51 K/UL — SIGNIFICANT CHANGE UP (ref 0–0.9)
MONOCYTES NFR BLD AUTO: 6.7 % — SIGNIFICANT CHANGE UP (ref 2–14)
NEUTROPHILS # BLD AUTO: 4 K/UL — SIGNIFICANT CHANGE UP (ref 1.8–7.4)
NEUTROPHILS NFR BLD AUTO: 52.6 % — SIGNIFICANT CHANGE UP (ref 43–77)
NITRITE UR-MCNC: NEGATIVE — SIGNIFICANT CHANGE UP
PCP SPEC-MCNC: SIGNIFICANT CHANGE UP
PH UR: 6.5 — SIGNIFICANT CHANGE UP (ref 5–8)
PLATELET # BLD AUTO: 379 K/UL — SIGNIFICANT CHANGE UP (ref 150–400)
POTASSIUM SERPL-MCNC: 3.8 MMOL/L — SIGNIFICANT CHANGE UP (ref 3.5–5.3)
POTASSIUM SERPL-SCNC: 3.8 MMOL/L — SIGNIFICANT CHANGE UP (ref 3.5–5.3)
PROT SERPL-MCNC: 8.4 GM/DL — HIGH (ref 6–8.3)
PROT UR-MCNC: NEGATIVE — SIGNIFICANT CHANGE UP
RBC # BLD: 6.04 M/UL — HIGH (ref 4.2–5.8)
RBC # FLD: 16.2 % — HIGH (ref 10.3–14.5)
RSV RNA NPH QL NAA+NON-PROBE: SIGNIFICANT CHANGE UP
SALICYLATES SERPL-MCNC: <1.7 MG/DL — LOW (ref 2.8–20)
SARS-COV-2 RNA SPEC QL NAA+PROBE: SIGNIFICANT CHANGE UP
SODIUM SERPL-SCNC: 139 MMOL/L — SIGNIFICANT CHANGE UP (ref 135–145)
SP GR SPEC: 1.02 — SIGNIFICANT CHANGE UP (ref 1.01–1.02)
UROBILINOGEN FLD QL: 1
WBC # BLD: 7.6 K/UL — SIGNIFICANT CHANGE UP (ref 3.8–10.5)
WBC # FLD AUTO: 7.6 K/UL — SIGNIFICANT CHANGE UP (ref 3.8–10.5)

## 2022-12-13 PROCEDURE — 99285 EMERGENCY DEPT VISIT HI MDM: CPT

## 2022-12-13 PROCEDURE — 80307 DRUG TEST PRSMV CHEM ANLYZR: CPT

## 2022-12-13 PROCEDURE — 90791 PSYCH DIAGNOSTIC EVALUATION: CPT

## 2022-12-13 PROCEDURE — 81001 URINALYSIS AUTO W/SCOPE: CPT

## 2022-12-13 PROCEDURE — 0241U: CPT

## 2022-12-13 PROCEDURE — 36415 COLL VENOUS BLD VENIPUNCTURE: CPT

## 2022-12-13 PROCEDURE — 99284 EMERGENCY DEPT VISIT MOD MDM: CPT

## 2022-12-13 PROCEDURE — 85025 COMPLETE CBC W/AUTO DIFF WBC: CPT

## 2022-12-13 PROCEDURE — 80053 COMPREHEN METABOLIC PANEL: CPT

## 2022-12-13 NOTE — ED PROVIDER NOTE - OBJECTIVE STATEMENT
37 y/o male with PMHx of sleep apnea, fatty liver, anxiety, depression and bipolar disorder presents to the ED c/o intermittent suicidal thoughts with a general plan. States these feelings have been getting worse. Saturday pt was trying to cut himself with a small kitchen knife, came to his senses and went to his mother-in-law's for his own safety. Pt states nothing has really happened to make him feel worse. Pt is on Lexapro and Lamictal. Pt is here seeking a higher level of care. No other complaints at this time. Pt states he has been seeing [facility] doc [facility] and is not sure what their name is.

## 2022-12-13 NOTE — ED ADULT NURSE NOTE - SUICIDE PROTECTIVE FACTORS
Responsibility to family and others/Identifies reasons for living/Has future plans/Supportive social network of family or friends/Fear of death or the actual act of killing self/Engaged in work or school/Positive therapeutic relationships/Ability to cope with stress

## 2022-12-13 NOTE — ED BEHAVIORAL HEALTH ASSESSMENT NOTE - DESCRIPTION
see hospitalist note See HPI Vital Signs Last 24 Hrs  T(C): 36.9 (13 Dec 2022 13:55), Max: 36.9 (13 Dec 2022 13:55)  T(F): 98.5 (13 Dec 2022 13:55), Max: 98.5 (13 Dec 2022 13:55)  HR: 80 (13 Dec 2022 13:55) (80 - 80)  BP: 150/89 (13 Dec 2022 13:55) (150/89 - 150/89)  BP(mean): 103 (13 Dec 2022 13:55) (103 - 103)  RR: 18 (13 Dec 2022 13:55) (18 - 18)  SpO2: 100% (13 Dec 2022 13:55) (100% - 100%)    Parameters below as of 13 Dec 2022 13:55  Patient On (Oxygen Delivery Method): room air

## 2022-12-13 NOTE — ED BEHAVIORAL HEALTH ASSESSMENT NOTE - PATIENT'S CHIEF COMPLAINT
"I am not suicidal or homicidal, I have a telehealth psychiatrist and therapist. I feel like I need a higher level of care, like a partial program."

## 2022-12-13 NOTE — ED BEHAVIORAL HEALTH ASSESSMENT NOTE - NSBHROSSTATEMENT_PSY_A_CORE
Writer attempted to reach pt x2 for scheduled 11:30 a.m. phone assessment appointment. No answer at 11:30, no answer at 11:38. Left VM with Scheduling number ([phone]) should pt wish to reschedule.    Cheyanne Jhaveri, TUANW  7/10/2020    
.

## 2022-12-13 NOTE — ED BEHAVIORAL HEALTH ASSESSMENT NOTE - NSBHATTESTAPPBILLTIME_PSY_A_CORE
I attest my time as BELL is greater than 50% of the total combined time spent on qualifying patient care activities. I have reviewed and verified the documentation.

## 2022-12-13 NOTE — ED BEHAVIORAL HEALTH ASSESSMENT NOTE - HPI (INCLUDE ILLNESS QUALITY, SEVERITY, DURATION, TIMING, CONTEXT, MODIFYING FACTORS, ASSOCIATED SIGNS AND SYMPTOMS)
Patient is a 36-year old , male, employed at [employer], domiciled with  and their 4 year old son in a private residence, Patient has a history of two prior psychiatric hospitalizations, last hospitalization was at  from 3/13/18 - 3/20/18 for depression and prior psychiatric hospitalization was at  in 2013. PPHx includes anxiety, bipolar depression and borderline personality disorder. Today the patient presented to the ED BIB [facility], with c/o worsening intermittent passive SI today with no plan. He reports symptoms include urges to self harm, last cut Saturday, superficial no marks observed. Patient currently denies any thoughts of self-harm or suicide, as well as any thoughts of violence, aggression or homicide.      Pt is currently in treatment with psychiatrist and therapist through telehealth, previously he was in treatment at [facility] but discontinued treatment when they transitioned to virtual visits because of the Covid-19 pandemic. The patient reports adherence to medication prescribed (Lexapro, and Lamictal). No known history of SA, no hx of aggression/violence and no hx of substance abuse or legal issues. Denies access to firearms. Denies current or recent manic sx. Denies current and history of psychotic sx. No substance abuse hx; does not use any illicit substances and drinks about once a month.     Collateral information was obtained from the patient’s spouse Damon LONG ([phone]). Per MERCEDES, he does not believe patient is a danger to self or others and does not feel the patient requires inpatient psychiatric hospitalization and reports "We [place] the [place] and are always there for him."

## 2022-12-13 NOTE — ED PROVIDER NOTE - PATIENT PORTAL LINK FT
You can access the FollowMyHealth Patient Portal offered by [facility] by registering at the following website: [email or web address]. By joining [employer]’s FollowMyHealth portal, you will also be able to view your health information using other applications (apps) compatible with our system.

## 2022-12-13 NOTE — ED BEHAVIORAL HEALTH ASSESSMENT NOTE - DETAILS
T&R Patient instructed to return to the ED or call 911 if patient experiences SI, HI, hopelessness, worsening of symptoms or has any other concerns. 4 years old See HPI

## 2022-12-13 NOTE — ED PROVIDER NOTE - PROGRESS NOTE DETAILS
LFTs at baseline.  NO symptoms.  CLeared by psych.  Pt and family comfortable with d/c home.  WIll f/u outpatient.  No SI/HI currently.  D/c home with strict return precautions and prompt outpatient f/u.

## 2022-12-13 NOTE — ED PROVIDER NOTE - NS ED ROS FT
Constitutional: nad, well appearing  HEENT:  no nasal congestion, eye drainage or ear pain.    CVS:  no cp  Resp:  No sob, no cough  GI:  no abdominal pain, no nausea or vomiting  :  no dysuria  MSK: no joint pain or limited ROM  Skin: no rash  Neuro: no change in mental status or level of consciousness  Heme/lymph: no bleeding   Psych: +SI

## 2022-12-13 NOTE — ED BEHAVIORAL HEALTH ASSESSMENT NOTE - SUMMARY
Patient is a 36-year old , male, employed at [employer], domiciled with  and their 4 year old son in a private residence, Patient has a history of two prior psychiatric hospitalizations, last hospitalization was at  from 3/13/18 - 3/20/18 for depression and prior psychiatric hospitalization was at  in 2013. PPHx includes anxiety, bipolar depression and borderline personality disorder. Today the patient presented to the ED [facility], with c/o worsening intermittent passive SI today with no plan. He reports symptoms include urges to self harm, last cut Saturday, superficial no marks observed. Patient currently denies any thoughts of self-harm or suicide, as well as any thoughts of violence, aggression or homicide.      Patient presents with symptoms indicative of bipolar d/o in remission and borderline personality disorder. No SHIIP. No AVH. No Tierney. No Psychosis. No delusions elicited. Patient is not an acute threat to self of others and does not warrant for an inpatient admission, with motivation to follow-up with outpatient psychiatrist and continue treatment. Patient is agreeable to discharge plan and able to engage in safety planning.

## 2022-12-13 NOTE — ED PROVIDER NOTE - NSICDXPASTMEDICALHX_GEN_ALL_CORE_FT
PAST MEDICAL HISTORY:  Anxiety     Bipolar disorder     Cholelithiases     Depression     Fatty liver     GERD (gastroesophageal reflux disease)     H/O hypogonadism     H/O thalassemia     Heart murmur     Hiatal hernia     PRASANNA on CPAP     Ventral hernia

## 2022-12-13 NOTE — ED PROVIDER NOTE - NSFOLLOWUPINSTRUCTIONS_ED_ALL_ED_FT
Depression    WHAT YOU NEED TO KNOW:    Depression is a medical condition that causes feelings of sadness or hopelessness that do not go away. Depression may cause you to lose interest in things you used to enjoy. These feelings may interfere with your daily life.    DISCHARGE INSTRUCTIONS:    Call your local emergency number (911 in the ) if:     You think about harming yourself or someone else.      You have done something on purpose to hurt yourself.    Call your therapist or doctor if:     Your symptoms do not improve.      You cannot make it to your next appointment.       You have new symptoms.      You have questions or concerns about your condition or care.    The following resources are available at any time to help you, if needed:     National Suicide Prevention Lifeline: 1-564.771.7043 (3-089-028-TALK)      Suicide Hotline: 1-741.542.2001 (1-206-LXUFHGV)      For a list of international numbers: https://[employer].org/find-help/international-resources/    Medicines:     Antidepressants may be given to improve or balance your mood. You may need to take this medicine for several weeks before you begin to feel better.      Take your medicine as directed. Contact your healthcare provider if you think your medicine is not helping or if you have side effects. Tell him of her if you are allergic to any medicine. Keep a list of the medicines, vitamins, and herbs you take. Include the amounts, and when and why you take them. Bring the list or the pill bottles to follow-up visits. Carry your medicine list with you in case of an emergency.    Therapy is often used together with medicine to relieve depression. Therapy is a way for you to talk about your feelings and anything that may be causing depression. Therapy can be done alone or in a group. It may also be done with family members or a significant other.    Self-care:     Get regular physical activity. Try to be active for 30 minutes, 3 to 5 days a week. Physical activity can help relieve depression. Work with your healthcare provider to develop a plan that you enjoy. It may help to ask someone to be active with you.      Create a regular sleep schedule. A routine can help you relax before bed. Listen to music, read, or do yoga. Try to go to bed and wake up at the same time every day. Sleep is important for emotional health.      Eat a variety of healthy foods. Healthy foods include fruits, vegetables, whole-grain breads, low-fat dairy products, lean meats, fish, and cooked beans. A healthy meal plan is low in fat, salt, and added sugar.      Do not drink alcohol or use drugs. Alcohol and drugs can make depression worse. Talk to your therapist or doctor if you need help quitting.    Follow up with your healthcare provider as directed: Your healthcare provider will monitor your progress at follow-up visits. He or she will also monitor your medicine if you take antidepressants. Your healthcare provider will ask if the medicine is helping. Tell him or her about any side effects or problems you may have with your medicine. The type or amount of medicine may need to be changed. Write down your questions so you remember to ask them during your visits.

## 2022-12-13 NOTE — ED ADULT TRIAGE NOTE - CHIEF COMPLAINT QUOTE
pt presents to ed for psychiatric evaluation - pt has hx of depression on Lamictal 200mg and Lexapro 20mg daily with telepsychiatry ( doesn't remember name), reports worsening depression over the last 6 weeks, on Saturday was self harming by cutting himself, reports passive suicidal thoughts with no active plan or intent. denies a/v hallucinations ,denies etoh/drug use, reports 2 past inpatient psychiatric hospitalizations for similar episodes at , most recent 2017, 1-1 initiated

## 2022-12-13 NOTE — ED BEHAVIORAL HEALTH ASSESSMENT NOTE - RISK ASSESSMENT
LOW RISK    ACUTE RISK FACTORS: Unsatisfied with telehealth treatment    CHRONIC RISK FACTORS: SIB, Bipolar d/o, Borderline personality disorder, past inpatient hospitalizations.     PROTECTIVE FACTORS: No suicide attempts, no violence history, medication compliance, no access to guns, no global insomnia, no substance abuse, supportive family, willingness to seek help, no suicidal ideation or homicidal ideation, hopefulness for future.

## 2022-12-30 ENCOUNTER — INPATIENT (INPATIENT)
Facility: HOSPITAL | Age: 37
LOS: 5 days | Discharge: HOME | DRG: 885 | End: 2023-01-05
Attending: PSYCHIATRY & NEUROLOGY | Admitting: PSYCHIATRY & NEUROLOGY
Payer: COMMERCIAL

## 2022-12-30 VITALS — HEIGHT: 70 IN | WEIGHT: 214.95 LBS

## 2022-12-30 DIAGNOSIS — Z98.890 OTHER SPECIFIED POSTPROCEDURAL STATES: Chronic | ICD-10-CM

## 2022-12-30 DIAGNOSIS — R45.851 SUICIDAL IDEATIONS: ICD-10-CM

## 2022-12-30 LAB
ALBUMIN SERPL ELPH-MCNC: 4.2 G/DL — SIGNIFICANT CHANGE UP (ref 3.3–5)
ALP SERPL-CCNC: 131 U/L — HIGH (ref 40–120)
ALT FLD-CCNC: 193 U/L — HIGH (ref 12–78)
AMPHET UR-MCNC: NEGATIVE — SIGNIFICANT CHANGE UP
ANION GAP SERPL CALC-SCNC: 4 MMOL/L — LOW (ref 5–17)
APAP SERPL-MCNC: < 2 UG/ML (ref 10–30)
APPEARANCE UR: CLEAR — SIGNIFICANT CHANGE UP
AST SERPL-CCNC: 144 U/L — HIGH (ref 15–37)
BACTERIA # UR AUTO: ABNORMAL
BARBITURATES UR SCN-MCNC: NEGATIVE — SIGNIFICANT CHANGE UP
BASO STIPL BLD QL SMEAR: PRESENT — SIGNIFICANT CHANGE UP
BASOPHILS # BLD AUTO: 0.03 K/UL — SIGNIFICANT CHANGE UP (ref 0–0.2)
BASOPHILS NFR BLD AUTO: 0.4 % — SIGNIFICANT CHANGE UP (ref 0–2)
BENZODIAZ UR-MCNC: NEGATIVE — SIGNIFICANT CHANGE UP
BILIRUB SERPL-MCNC: 0.9 MG/DL — SIGNIFICANT CHANGE UP (ref 0.2–1.2)
BILIRUB UR-MCNC: NEGATIVE — SIGNIFICANT CHANGE UP
BUN SERPL-MCNC: 14 MG/DL — SIGNIFICANT CHANGE UP (ref 7–23)
CALCIUM SERPL-MCNC: 9.6 MG/DL — SIGNIFICANT CHANGE UP (ref 8.5–10.1)
CHLORIDE SERPL-SCNC: 106 MMOL/L — SIGNIFICANT CHANGE UP (ref 96–108)
CO2 SERPL-SCNC: 28 MMOL/L — SIGNIFICANT CHANGE UP (ref 22–31)
COCAINE METAB.OTHER UR-MCNC: NEGATIVE — SIGNIFICANT CHANGE UP
COLOR SPEC: YELLOW — SIGNIFICANT CHANGE UP
CREAT SERPL-MCNC: 0.83 MG/DL — SIGNIFICANT CHANGE UP (ref 0.5–1.3)
DIFF PNL FLD: NEGATIVE — SIGNIFICANT CHANGE UP
EGFR: 117 ML/MIN/1.73M2 — SIGNIFICANT CHANGE UP
EOSINOPHIL # BLD AUTO: 0.17 K/UL — SIGNIFICANT CHANGE UP (ref 0–0.5)
EOSINOPHIL NFR BLD AUTO: 2.1 % — SIGNIFICANT CHANGE UP (ref 0–6)
EPI CELLS # UR: SIGNIFICANT CHANGE UP
ETHANOL SERPL-MCNC: <10 MG/DL — SIGNIFICANT CHANGE UP (ref 0–10)
FLUAV AG NPH QL: SIGNIFICANT CHANGE UP
FLUBV AG NPH QL: SIGNIFICANT CHANGE UP
GLUCOSE SERPL-MCNC: 167 MG/DL — HIGH (ref 70–99)
GLUCOSE UR QL: NEGATIVE — SIGNIFICANT CHANGE UP
HCT VFR BLD CALC: 39.9 % — SIGNIFICANT CHANGE UP (ref 39–50)
HGB BLD-MCNC: 12.4 G/DL — LOW (ref 13–17)
IMM GRANULOCYTES NFR BLD AUTO: 0.6 % — SIGNIFICANT CHANGE UP (ref 0–0.9)
KETONES UR-MCNC: ABNORMAL
LEUKOCYTE ESTERASE UR-ACNC: ABNORMAL
LYMPHOCYTES # BLD AUTO: 2.74 K/UL — SIGNIFICANT CHANGE UP (ref 1–3.3)
LYMPHOCYTES # BLD AUTO: 34.6 % — SIGNIFICANT CHANGE UP (ref 13–44)
MANUAL SMEAR VERIFICATION: SIGNIFICANT CHANGE UP
MCHC RBC-ENTMCNC: 20.3 PG — LOW (ref 27–34)
MCHC RBC-ENTMCNC: 31.1 GM/DL — LOW (ref 32–36)
MCV RBC AUTO: 65.4 FL — LOW (ref 80–100)
METHADONE UR-MCNC: NEGATIVE — SIGNIFICANT CHANGE UP
MICROCYTES BLD QL: SIGNIFICANT CHANGE UP
MONOCYTES # BLD AUTO: 0.65 K/UL — SIGNIFICANT CHANGE UP (ref 0–0.9)
MONOCYTES NFR BLD AUTO: 8.2 % — SIGNIFICANT CHANGE UP (ref 2–14)
NEUTROPHILS # BLD AUTO: 4.29 K/UL — SIGNIFICANT CHANGE UP (ref 1.8–7.4)
NEUTROPHILS NFR BLD AUTO: 54.1 % — SIGNIFICANT CHANGE UP (ref 43–77)
NITRITE UR-MCNC: NEGATIVE — SIGNIFICANT CHANGE UP
OPIATES UR-MCNC: NEGATIVE — SIGNIFICANT CHANGE UP
PCP SPEC-MCNC: SIGNIFICANT CHANGE UP
PCP UR-MCNC: NEGATIVE — SIGNIFICANT CHANGE UP
PH UR: 5 — SIGNIFICANT CHANGE UP (ref 5–8)
PLAT MORPH BLD: NORMAL — SIGNIFICANT CHANGE UP
PLATELET # BLD AUTO: 365 K/UL — SIGNIFICANT CHANGE UP (ref 150–400)
POTASSIUM SERPL-MCNC: 3.9 MMOL/L — SIGNIFICANT CHANGE UP (ref 3.5–5.3)
POTASSIUM SERPL-SCNC: 3.9 MMOL/L — SIGNIFICANT CHANGE UP (ref 3.5–5.3)
PROT SERPL-MCNC: 8.2 GM/DL — SIGNIFICANT CHANGE UP (ref 6–8.3)
PROT UR-MCNC: NEGATIVE — SIGNIFICANT CHANGE UP
RBC # BLD: 6.1 M/UL — HIGH (ref 4.2–5.8)
RBC # FLD: 16 % — HIGH (ref 10.3–14.5)
RBC BLD AUTO: ABNORMAL
RBC CASTS # UR COMP ASSIST: NEGATIVE /HPF — SIGNIFICANT CHANGE UP (ref 0–4)
RSV RNA NPH QL NAA+NON-PROBE: SIGNIFICANT CHANGE UP
SALICYLATES SERPL-MCNC: <1.7 MG/DL — LOW (ref 2.8–20)
SARS-COV-2 RNA SPEC QL NAA+PROBE: SIGNIFICANT CHANGE UP
SODIUM SERPL-SCNC: 138 MMOL/L — SIGNIFICANT CHANGE UP (ref 135–145)
SP GR SPEC: 1.02 — SIGNIFICANT CHANGE UP (ref 1.01–1.02)
THC UR QL: NEGATIVE — SIGNIFICANT CHANGE UP
UROBILINOGEN FLD QL: 1
WBC # BLD: 7.93 K/UL — SIGNIFICANT CHANGE UP (ref 3.8–10.5)
WBC # FLD AUTO: 7.93 K/UL — SIGNIFICANT CHANGE UP (ref 3.8–10.5)
WBC UR QL: ABNORMAL /HPF (ref 0–5)

## 2022-12-30 PROCEDURE — 83036 HEMOGLOBIN GLYCOSYLATED A1C: CPT

## 2022-12-30 PROCEDURE — 36415 COLL VENOUS BLD VENIPUNCTURE: CPT

## 2022-12-30 PROCEDURE — 93010 ELECTROCARDIOGRAM REPORT: CPT

## 2022-12-30 PROCEDURE — 99222 1ST HOSP IP/OBS MODERATE 55: CPT

## 2022-12-30 PROCEDURE — 99285 EMERGENCY DEPT VISIT HI MDM: CPT

## 2022-12-30 PROCEDURE — 84443 ASSAY THYROID STIM HORMONE: CPT

## 2022-12-30 PROCEDURE — 80061 LIPID PANEL: CPT

## 2022-12-30 RX ORDER — LAMOTRIGINE 25 MG/1
200 TABLET, ORALLY DISINTEGRATING ORAL DAILY
Refills: 0 | Status: DISCONTINUED | OUTPATIENT
Start: 2022-12-30 | End: 2023-01-05

## 2022-12-30 RX ORDER — MAGNESIUM HYDROXIDE 400 MG/1
30 TABLET, CHEWABLE ORAL DAILY
Refills: 0 | Status: DISCONTINUED | OUTPATIENT
Start: 2022-12-30 | End: 2023-01-05

## 2022-12-30 RX ORDER — DEXTROAMPHETAMINE SACCHARATE, AMPHETAMINE ASPARTATE, DEXTROAMPHETAMINE SULFATE AND AMPHETAMINE SULFATE 1.875; 1.875; 1.875; 1.875 MG/1; MG/1; MG/1; MG/1
1 TABLET ORAL
Qty: 0 | Refills: 0 | DISCHARGE

## 2022-12-30 RX ORDER — ACETAMINOPHEN 500 MG
650 TABLET ORAL EVERY 6 HOURS
Refills: 0 | Status: DISCONTINUED | OUTPATIENT
Start: 2022-12-30 | End: 2023-01-05

## 2022-12-30 RX ORDER — ESCITALOPRAM OXALATE 10 MG/1
20 TABLET, FILM COATED ORAL DAILY
Refills: 0 | Status: DISCONTINUED | OUTPATIENT
Start: 2022-12-30 | End: 2022-12-31

## 2022-12-30 RX ORDER — HYDROXYZINE HCL 10 MG
50 TABLET ORAL EVERY 6 HOURS
Refills: 0 | Status: DISCONTINUED | OUTPATIENT
Start: 2022-12-30 | End: 2023-01-05

## 2022-12-30 NOTE — BH PATIENT PROFILE - FALL HARM RISK - UNIVERSAL INTERVENTIONS
Non-slip footwear when patient is out of bed/[place] to call system/Physically safe environment - no spills, clutter or unnecessary equipment/Purposeful Proactive Rounding/Room/bathroom lighting operational, light cord in reach

## 2022-12-30 NOTE — ED BEHAVIORAL HEALTH ASSESSMENT NOTE - PATIENT'S CHIEF COMPLAINT
"I am having suicidal ideations, yesterday morning I was going to take a bottle of my pills and hope that it would effect my organs and I would die slowly."

## 2022-12-30 NOTE — BH PATIENT PROFILE - HOME MEDICATIONS
Pepcid 20 mg oral tablet , 1 tab(s) orally 2 times a day  LaMICtal 200 mg oral tablet , 1 tab(s) orally one  times a day  Lexapro 20 mg oral tablet , 1 tab(s) orally once a day until told to discontinue by MD

## 2022-12-30 NOTE — ED PROVIDER NOTE - OBJECTIVE STATEMENT
37 yr old male with PMHx of bipolar disorder, presents to ED for increasing SI. Pt states he began having SI for the past 6 weeks to 2 months. Pt was considering overdose. Pt was here a week and a half ago trying to get outpatient therapy but with no success. 37 yr old male with PMHx of bipolar disorder, presents to ED for increasing SI. Pt states he began having SI for the past 6 weeks to 2 months. Pt was considering overdose with medication. Pt was here a week and a half ago trying to get outpatient therapy but with no success. has had previous psychiatric admissions.

## 2022-12-30 NOTE — H&P ADULT - NSHPPHYSICALEXAM_GEN_ALL_CORE
ICU Vital Signs Last 24 Hrs  T(C): 36.6 (30 Dec 2022 17:55), Max: 37.2 (30 Dec 2022 14:49)  T(F): 97.8 (30 Dec 2022 17:55), Max: 98.9 (30 Dec 2022 14:49)  HR: 87 (30 Dec 2022 14:49) (87 - 87)  BP: 139/91 (30 Dec 2022 14:49) (139/91 - 139/91)  BP(mean): 101 (30 Dec 2022 14:49) (101 - 101-  RR: 20 (30 Dec 2022 17:55) (18 - 20)  SpO2: 97% (30 Dec 2022 17:55) (97% - 97%)    O2 Parameters below as of 30 Dec 2022 17:55  Patient On (Oxygen Delivery Method): room air

## 2022-12-30 NOTE — ED BEHAVIORAL HEALTH ASSESSMENT NOTE - SUMMARY
Patient is a 36-year old , male, employed at [employer], domiciled with  and their 4 year old son in a private residence, Patient has a history of two prior psychiatric hospitalizations, last hospitalization was at  from 3/13/18 - 3/20/18 for depression and prior psychiatric hospitalization was at  in 2013. PPHx includes anxiety, bipolar depression and borderline personality disorder. Today the patient presented to the ED [facility], with c/o worsening intermittent passive SI today with no plan. He reports symptoms include urges to self harm, last cut Saturday, superficial no marks observed. Patient currently denies any thoughts of self-harm or suicide, as well as any thoughts of violence, aggression or homicide.      Patient presents with symptoms indicative of bipolar d/o in remission and borderline personality disorder. No SHIIP. No AVH. No Tierney. No Psychosis. No delusions elicited. These symptoms represent a change from baseline from which the patient cannot be reasonably expected to improve with current level of care. The patient presents with risk requiring inpatient psychiatric hospitalization for safety and stabilization.

## 2022-12-30 NOTE — ED PROVIDER NOTE - PSYCHIATRIC, MLM
Alert and oriented to person, place, time/situation. normal mood and affect. no apparent risk to self or others. Alert and oriented to person, place, time/situation. normal mood and affect

## 2022-12-30 NOTE — H&P ADULT - ASSESSMENT
38 yo M with PMH significant for Thalassemia Minor, Non Alcoholic fatty liver, Depression, Anxiety, Borderline personality disorder admitted to [facility] for Mx of worsening of suicidal ideation.     # Depression/ Anxiety/ Borderline personality disorder/ Suicidal ideation/ Psych problems  - Manage as per primary psych team     # Anemia  # Hx of Thalassemia Minor   - H/H: 12.4/39.9  - Pt reported his baseline Hb is 12  - Pt denies any active GI or  bleeding  - Outpt  f/u with PCP and Hematologist after discharge for further eval.     # Hx of Non alcoholic Fatty liver   - Transaminitis likely 2/2 to it  - d/w Pt to avoid alcohol or any hepatotoxic meds  - Pt reported that he goes to follow up with Hepatologist and planning to go soon  - Outpt f/u with PCP and Hepatologist after discharge for further eval.     # DVT Ppx  - Encourage Ambulation    IMPROVE VTE Individual Risk Assessment    RISK                                                                Points    [  ] Previous VTE                                                  3    [  ] Thrombophilia                                               2    [  ] Lower limb paralysis                                      2        (unable to hold up >15 seconds)      [  ] Current Cancer                                              2         (within 6 months)    [  ] Immobilization > 24 hrs                                1    [  ] ICU/CCU stay > 24 hours                              1    [  ] Age > 60                                                      1    IMPROVE VTE Score ____0_____    IMPROVE Score 0-1: Low Risk, No VTE prophylaxis required for most patients, encourage ambulation.   IMPROVE Score 2-3: At risk, pharmacologic VTE prophylaxis is indicated for most patients (in the absence of a contraindication)  IMPROVE Score > or = 4: High Risk, pharmacologic VTE prophylaxis is indicated for most patients (in the absence of a contraindication)        Case d/w Dr. Horne

## 2022-12-30 NOTE — ED ADULT NURSE NOTE - NSIMPLEMENTINTERV_GEN_ALL_ED
Implemented All Universal Safety Interventions:  [place] to call system. Call bell, personal items and telephone within reach. Instruct patient to call for assistance. Room bathroom lighting operational. Non-slip footwear when patient is off stretcher. Physically safe environment: no spills, clutter or unnecessary equipment. Stretcher in lowest position, wheels locked, appropriate side rails in place.

## 2022-12-30 NOTE — H&P ADULT - NS ATTEND AMEND GEN_ALL_CORE FT
38 y/o M PMHx as noted above admitted to inpatient Psychiatry for worsening of suicidal ideation.     #Depression/ Anxiety/ Borderline personality disorder/ Suicidal ideation  -cont. management per inpatient Psychiatry    #Hx of Thalassemia Minor   -labs reviewed   -cont. management as above     #Hx of Non alcoholic Fatty liver   -as noted above labs reviewed   -cont. management as outlined above     #Vte ppx   -Encourage Ambulation

## 2022-12-30 NOTE — BH PATIENT PROFILE - NSDASASENSITIVE_PSY_ALL_CORE
Patient stated that his pain is a 10/10 morphine was given. Called into OR 23 to ask for oxycodone. Patient stated that norco does not work for him. Dr. Swartz stated that he is not getting Oxycodone. Explained to the patient that he is not getting oxycodone but he will take the Norco. Still stating that his fingers are numb, asked if he can feel them he stated yes.   Gave more apple juice and chips. Will continue to monitor.    No

## 2022-12-30 NOTE — H&P ADULT - NSTOBACCOSCREENHP_GEN_A_CS
How Severe Are Your Spot(S)?: mild What Type Of Note Output Would You Prefer (Optional)?: Standard Output What Is The Reason For Today's Visit?: Full Body Skin Examination What Is The Reason For Today's Visit? (Being Monitored For X): the development of new lesions Mother No

## 2022-12-30 NOTE — ED ADULT TRIAGE NOTE - CHIEF COMPLAINT QUOTE
Patient reports suicidal thoughts. Reports being discharged two weeks prior after being seen for same complaint. Reports thoughts have worsened.  Damon Alvarez with patient at triage patient gave consent for him to be contacted

## 2022-12-30 NOTE — ED ADULT NURSE NOTE - OBJECTIVE STATEMENT
37 YOM complaining of suicidal thoughts. Patient is A & Ox 3. The patient denies chest pain/ SOB. Patient states that he's "had these thoughts for a while". The patient states that he had a plan to commit suicide yesterday. The patient denies homicidal ideation. The patient's bed is in the lowest position semi fowlers with the bed rails up and constant observation present.
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
## 2022-12-30 NOTE — BH PATIENT PROFILE - NSCMSPTSTRENGTHS_PSY_ALL_CORE
Malena/spirituality/Intact employment/Intact family/Intelligence/Strong support system/Supportive family

## 2022-12-30 NOTE — ED PROVIDER NOTE - PROGRESS NOTE DETAILS
Nohemy DO: s/o to Dr. Pascual pending medical screening work up and psych eval sign out to me: tba to psych under dr. arriaza. admit orders placed prior cmp resulting. Donald Pascual MD.

## 2022-12-30 NOTE — ED PROVIDER NOTE - CLINICAL SUMMARY MEDICAL DECISION MAKING FREE TEXT BOX
37 yr old male with SI, medical screening exam and psych consul. 37 yr old male with SI, medical screening exam and psych consult

## 2022-12-30 NOTE — ED BEHAVIORAL HEALTH ASSESSMENT NOTE - DESCRIPTION
Vital Signs Last 24 Hrs  T(C): 37.2 (30 Dec 2022 14:49), Max: 37.2 (30 Dec 2022 14:49)  T(F): 98.9 (30 Dec 2022 14:49), Max: 98.9 (30 Dec 2022 14:49)  HR: 87 (30 Dec 2022 14:49) (87 - 87)  BP: 139/91 (30 Dec 2022 14:49) (139/91 - 139/91)  BP(mean): 101 (30 Dec 2022 14:49) (101 - 101)  RR: 18 (30 Dec 2022 14:49) (18 - 18)  SpO2: 97% (30 Dec 2022 14:49) (97% - 97%)    Parameters below as of 30 Dec 2022 14:49  Patient On (Oxygen Delivery Method): room air see hospitalist note See HPI

## 2022-12-31 DIAGNOSIS — F41.1 GENERALIZED ANXIETY DISORDER: ICD-10-CM

## 2022-12-31 LAB
A1C WITH ESTIMATED AVERAGE GLUCOSE RESULT: 6.3 % — HIGH (ref 4–5.6)
CHOLEST SERPL-MCNC: 192 MG/DL — SIGNIFICANT CHANGE UP
ESTIMATED AVERAGE GLUCOSE: 134 MG/DL — HIGH (ref 68–114)
HDLC SERPL-MCNC: 37 MG/DL — LOW
LIPID PNL WITH DIRECT LDL SERPL: 111 MG/DL — HIGH
NON HDL CHOLESTEROL: 155 MG/DL — HIGH
TRIGL SERPL-MCNC: 223 MG/DL — HIGH
TSH SERPL-MCNC: 0.65 UU/ML — SIGNIFICANT CHANGE UP (ref 0.34–4.82)

## 2022-12-31 PROCEDURE — 99232 SBSQ HOSP IP/OBS MODERATE 35: CPT

## 2022-12-31 RX ORDER — ESCITALOPRAM OXALATE 10 MG/1
5 TABLET, FILM COATED ORAL ONCE
Refills: 0 | Status: COMPLETED | OUTPATIENT
Start: 2022-12-31 | End: 2022-12-31

## 2022-12-31 RX ORDER — ESCITALOPRAM OXALATE 10 MG/1
25 TABLET, FILM COATED ORAL DAILY
Refills: 0 | Status: DISCONTINUED | OUTPATIENT
Start: 2023-01-01 | End: 2023-01-05

## 2022-12-31 RX ORDER — LAMOTRIGINE 25 MG/1
25 TABLET, ORALLY DISINTEGRATING ORAL AT BEDTIME
Refills: 0 | Status: DISCONTINUED | OUTPATIENT
Start: 2022-12-31 | End: 2023-01-02

## 2022-12-31 RX ADMIN — LAMOTRIGINE 25 MILLIGRAM(S): 25 TABLET, ORALLY DISINTEGRATING ORAL at 21:49

## 2022-12-31 RX ADMIN — LAMOTRIGINE 200 MILLIGRAM(S): 25 TABLET, ORALLY DISINTEGRATING ORAL at 10:04

## 2022-12-31 RX ADMIN — ESCITALOPRAM OXALATE 20 MILLIGRAM(S): 10 TABLET, FILM COATED ORAL at 10:03

## 2022-12-31 RX ADMIN — ESCITALOPRAM OXALATE 5 MILLIGRAM(S): 10 TABLET, FILM COATED ORAL at 18:42

## 2022-12-31 RX ADMIN — Medication 1 TABLET(S): at 18:42

## 2022-12-31 NOTE — BH INPATIENT PSYCHIATRY ASSESSMENT NOTE - NSSUICPROTFACT_PSY_ALL_CORE
Responsibility to children, family, or others/Identifies reasons for living/Supportive social network of family or friends/Engaged in work or school/Positive therapeutic relationships/Ability to cope with stress/Beloved pets

## 2022-12-31 NOTE — BH INPATIENT PSYCHIATRY ASSESSMENT NOTE - NSCOMMENTSUICRISKFACT_PSY_ALL_CORE
The pt. currently denies any active SI or HI and he denies any current plan or intent to harm either himself or anyone else

## 2022-12-31 NOTE — BH INPATIENT PSYCHIATRY ASSESSMENT NOTE - NSBHCHARTREVIEWVS_PSY_A_CORE FT
Vital Signs Last 24 Hrs  T(C): 36.8 (12-31-22 @ 07:22), Max: 37.2 (12-30-22 @ 14:49)  T(F): 98.2 (12-31-22 @ 07:22), Max: 98.9 (12-30-22 @ 14:49)  HR: 87 (12-30-22 @ 14:49) (87 - 87)  BP: 139/91 (12-30-22 @ 14:49) (139/91 - 139/91)  BP(mean): 101 (12-30-22 @ 14:49) (101 - 101)  RR: 17 (12-31-22 @ 07:22) (17 - 20)  SpO2: 100% (12-31-22 @ 07:22) (97% - 100%)    Orthostatic VS  12-31-22 @ 07:22  Lying BP: --/-- HR: --  Sitting BP: 129/69 HR: 83  Standing BP: 121/73 HR: 84  Site: upper right arm  Mode: electronic  Orthostatic VS  12-30-22 @ 17:55  Lying BP: --/-- HR: --  Sitting BP: 134/84 HR: 82  Standing BP: 129/68 HR: 86  Site: --  Mode: --

## 2022-12-31 NOTE — BH INPATIENT PSYCHIATRY ASSESSMENT NOTE - NSBHMETABOLIC_PSY_ALL_CORE_FT
BMI: BMI (kg/m2): 30.8 (12-30-22 @ 17:55)  HbA1c:   Glucose:   BP: 139/91 (12-30-22 @ 14:49) (139/91 - 139/91)  Lipid Panel:
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
## 2022-12-31 NOTE — BH INPATIENT PSYCHIATRY ASSESSMENT NOTE - PATIENT'S CHIEF COMPLAINT
" I have been struggling with mood swings and depression for several years but the past 6 to 8 weeks I knew it was becoming crisis time. I was really feeling like I wanted to do something to hurt myself so I decided I should be admitted to the hospital."

## 2022-12-31 NOTE — BH INPATIENT PSYCHIATRY ASSESSMENT NOTE - NSTXCOPEGOAL_PSY_ALL_CORE
Pt to triage with c/o bilat ankle pains x 1 week, denies trauma/fever, hx gout, pt in wheelchair d/t pain of walking, aox4, resp even/unlabored    Identify and utilize 2 coping skills that meet their needs

## 2022-12-31 NOTE — BH INPATIENT PSYCHIATRY ASSESSMENT NOTE - NSDCCRITERIA_PSY_ALL_CORE
increased mood and affective stability  decrease in generalized anxiety and tendency to " over think" events

## 2022-12-31 NOTE — BH INPATIENT PSYCHIATRY ASSESSMENT NOTE - CURRENT MEDICATION
MEDICATIONS  (STANDING):  escitalopram 5 milliGRAM(s) Oral once  lamoTRIgine 25 milliGRAM(s) Oral at bedtime  lamoTRIgine 200 milliGRAM(s) Oral daily  multivitamin 1 Tablet(s) Oral daily    MEDICATIONS  (PRN):  acetaminophen     Tablet .. 650 milliGRAM(s) Oral every 6 hours PRN Temp greater or equal to 38C (100.4F), Mild Pain (1 - 3), Moderate Pain (4 - 6)  aluminum hydroxide/magnesium hydroxide/simethicone Suspension 30 milliLiter(s) Oral every 6 hours PRN Dyspepsia  hydrOXYzine hydrochloride 50 milliGRAM(s) Oral every 6 hours PRN Anxiety  magnesium hydroxide Suspension 30 milliLiter(s) Oral daily PRN Constipation

## 2023-01-01 PROCEDURE — 99232 SBSQ HOSP IP/OBS MODERATE 35: CPT

## 2023-01-01 RX ADMIN — ESCITALOPRAM OXALATE 25 MILLIGRAM(S): 10 TABLET, FILM COATED ORAL at 09:46

## 2023-01-01 RX ADMIN — LAMOTRIGINE 25 MILLIGRAM(S): 25 TABLET, ORALLY DISINTEGRATING ORAL at 21:04

## 2023-01-01 RX ADMIN — LAMOTRIGINE 200 MILLIGRAM(S): 25 TABLET, ORALLY DISINTEGRATING ORAL at 09:45

## 2023-01-01 RX ADMIN — Medication 1 TABLET(S): at 09:49

## 2023-01-01 NOTE — BH SOCIAL WORK INITIAL PSYCHOSOCIAL EVALUATION - NSHIGHRISKBEHFT_PSY_ALL_CORE
Pt with prior psychiatric hospitalizations, depression, anxiety, bipolar, borderline personality disorder, suicidal ideation with plan, urges to self harm, hx of cutting superficial.
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
## 2023-01-01 NOTE — BH SOCIAL WORK INITIAL PSYCHOSOCIAL EVALUATION - NSBHBARRIERS_PSY_ALL_CORE
lack of out pt support as Pt was doing better with in person therapy and psych follow ups. NO TELEHEALTH

## 2023-01-01 NOTE — BH INPATIENT PSYCHIATRY PROGRESS NOTE - NSBHFUPINTERVALHXFT_PSY_A_CORE
Pt seen on the unit. Pt appears anxious and depressed but he is motivated to purse treatment for his anxiety and depression. Plan reviewed with the pt to slowly titrate the pt's Lamictal dose via addition of medication q hs " because I think my mood swings have been the biggest problem now and not my depression. "   The pt is tolerating his current med regimen without adverse effects reported. The pt has been more visible on the unit and was observed visiting with family during visiting time. The pt denies current SI /HI but he remains dysphoric and tense.  Judgment and insight appear somewhat impaired but will likely improve with multimodal treatment.

## 2023-01-01 NOTE — BH SOCIAL WORK INITIAL PSYCHOSOCIAL EVALUATION - NSPTSTATEDGOAL_PSY_ALL_CORE
Pt would like to have referral for Psych med mgmt and therapy referral (in person for both as virtual did not work well for Pts needs).
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
## 2023-01-01 NOTE — BH SOCIAL WORK INITIAL PSYCHOSOCIAL EVALUATION - OTHER PAST PSYCHIATRIC HISTORY (INCLUDE DETAILS REGARDING ONSET, COURSE OF ILLNESS, INPATIENT/OUTPATIENT TREATMENT)
Patient has a history of two prior psychiatric hospitalizations, last hospitalization was at  from 3/13/18 - 3/20/18 for depression and prior psychiatric hospitalization was at  in 2013. PPHx includes anxiety, bipolar depression and borderline personality disorder. Upon admission patient presented to  ED with c/o worsening suicidal ideation with plan, urges to self harm, hx of cutting superficial.
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
## 2023-01-01 NOTE — BH SOCIAL WORK INITIAL PSYCHOSOCIAL EVALUATION - NSCMSPTSTRENGTHS_PSY_ALL_CORE
Malena/spirituality/Intact employment/Intact family/Intelligence/Strong support system/Supportive family Expressive of emotions/Financial stability/Future/goal oriented/Highly motivated for treatment/Intact employment/Intact family/Intelligence/Motivated/Resourceful/Strong support system/Supportive family

## 2023-01-01 NOTE — BH SOCIAL WORK INITIAL PSYCHOSOCIAL EVALUATION - NSBHTREATHXADM_PSY_ALL_CORE
psychiatrist and therapist through telehealth, previously he was in treatment at [facility] but discontinued treatment when they transitioned to virtual visits because of the Covid-19 pandemic. Pt would like to have referral for Psych med mgmt and therapy referral (in person for both as virtual did not work well for Pts needs)./No

## 2023-01-02 PROCEDURE — 99232 SBSQ HOSP IP/OBS MODERATE 35: CPT

## 2023-01-02 RX ORDER — LAMOTRIGINE 25 MG/1
50 TABLET, ORALLY DISINTEGRATING ORAL AT BEDTIME
Refills: 0 | Status: DISCONTINUED | OUTPATIENT
Start: 2023-01-02 | End: 2023-01-05

## 2023-01-02 RX ADMIN — LAMOTRIGINE 200 MILLIGRAM(S): 25 TABLET, ORALLY DISINTEGRATING ORAL at 10:32

## 2023-01-02 RX ADMIN — Medication 1 TABLET(S): at 10:33

## 2023-01-02 RX ADMIN — ESCITALOPRAM OXALATE 25 MILLIGRAM(S): 10 TABLET, FILM COATED ORAL at 10:32

## 2023-01-02 RX ADMIN — LAMOTRIGINE 50 MILLIGRAM(S): 25 TABLET, ORALLY DISINTEGRATING ORAL at 21:11

## 2023-01-02 NOTE — BH TREATMENT PLAN - NSTXDEPRESINTERRN_PSY_ALL_CORE
Nurse will educate patient on different forms of coping skills.  Nurse will encourage patient to set a daily SMART goal.

## 2023-01-02 NOTE — BH TREATMENT PLAN - NSTXCOPEINTERPR_PSY_ALL_CORE
Patient will be encouraged to attend 1-2 psych rehab groups daily to work on coping skill development.

## 2023-01-02 NOTE — BH TREATMENT PLAN - NSCMSPTSTRENGTHS_PSY_ALL_CORE
Able to adapt/Assertive/Compliance to treatment/Courageous/Expressive of emotions/Financial stability/Future/goal oriented/Highly motivated for treatment/Intact employment/Intact family/Intelligence/Interpersonal skills/Motivated/Physically healthy/Resourceful/Sense of Humor/Strong support system/Supportive family

## 2023-01-02 NOTE — BH TREATMENT PLAN - NSPTSTATEDGOAL_PSY_ALL_CORE
" I would like my mood swings to be under better control so I don't get so upset so fast when I talk with people."

## 2023-01-02 NOTE — BH INPATIENT PSYCHIATRY PROGRESS NOTE - NSBHFUPINTERVALHXFT_PSY_A_CORE
Pt seen in the day room . The pt continues to appear somewhat depressed and anxious, downhearted and pessimistic at times , soft spoken but logical and coherent. The pt reported fair sleep and appetite and he has tried to motivate himself to attend therapy groups which has enjoyed to date.  Plan reviewed with the pt to slowly titrate the pt's Lamictal dose via addition of medication q hs " because I think my mood swings have been the biggest problem now and not my depression. " As above, the pt still appears somewhat depressed with gaze aversion and seeming distraction and ? self blaming or recriminations related to the pt's decision to be admitted when his  had reportedly initially advocated for the pt to remain at home and to attend outpatient treatment instead.   The pt is tolerating his current med regimen without adverse effects reported. The pt has been more visible on the unit and was observed visiting with family during visiting time. The pt denies current SI /HI but he remains dysphoric and tense.  Judgment and insight appear somewhat impaired but will likely improve with multimodal treatment. The pt reported having spoken by phone with his  and the pt's face lights up when he speaks of their 6 yr-old son Orestes with whom the pt has spoken with by phone as well.  Pt seen in the day room . The pt continues to appear somewhat depressed and anxious, downhearted and pessimistic at times , soft spoken but logical and coherent. The pt reported fair sleep and appetite and he has tried to motivate himself to attend therapy groups which has enjoyed to date.  Plan reviewed with the pt to slowly titrate the pt's standing morning Lamictal dose of  200 mg po q am with now increased dose of  50  mg po qhs   " because I think my mood swings have been the biggest problem now and not my depression. " As above, the pt still appears somewhat depressed with gaze aversion and seeming distraction and ? self blaming or recriminations related to the pt's decision to be admitted when his  had reportedly initially advocated for the pt to remain at home and to attend outpatient treatment instead.   The pt is tolerating his current med regimen without adverse effects reported. The pt has been more visible on the unit and was observed visiting with family during visiting time. The pt denies current SI /HI but he remains dysphoric and tense.  Judgment and insight appear somewhat impaired but will likely improve with multimodal treatment. The pt reported having spoken by phone with his  and the pt's face lights up when he speaks of their 6 yr-old son Orestes with whom the pt has spoken with by phone as well.

## 2023-01-02 NOTE — BH TREATMENT PLAN - NSTXPLANTHERAPYSESSIONSFT_PSY_ALL_CORE
12-31-22  Type of therapy: Creative arts therapy,Other,Goals  Type of session: Group  Level of patient participation: Engaged,Participates  Duration of participation: 60 minutes  Therapy conducted by: Psych rehab  Therapy Summary: Patient meaningfully participated in Morning Group and Afternoon Art Therapy Group.    01-02-23  Type of therapy: Dialectical behavior therapy  Type of session: Group  Level of patient participation: Engaged,Participates  Duration of participation: 60 minutes  Therapy conducted by: Psych rehab  Therapy Summary: Patient actively participated in Community Meeting, Morning Perspectives Group, and Afternoon Group and was able to engage in the topics.

## 2023-01-02 NOTE — BH TREATMENT PLAN - NSTXDEPRESINTERMD_PSY_ALL_CORE
1. Continue Lexapro 25 mg po q am  2.To titrate Lamictal to 200 mg po q am and 50 mg po qhs to further stabilize the pt's affective volatility   3.Pt encouraged to attend therapy groups as tolerated  4.Disposition planning ongoing

## 2023-01-03 PROCEDURE — 99232 SBSQ HOSP IP/OBS MODERATE 35: CPT

## 2023-01-03 RX ADMIN — LAMOTRIGINE 50 MILLIGRAM(S): 25 TABLET, ORALLY DISINTEGRATING ORAL at 21:15

## 2023-01-03 RX ADMIN — LAMOTRIGINE 200 MILLIGRAM(S): 25 TABLET, ORALLY DISINTEGRATING ORAL at 09:05

## 2023-01-03 RX ADMIN — Medication 1 TABLET(S): at 09:05

## 2023-01-03 RX ADMIN — ESCITALOPRAM OXALATE 25 MILLIGRAM(S): 10 TABLET, FILM COATED ORAL at 09:05

## 2023-01-03 NOTE — BH INPATIENT PSYCHIATRY PROGRESS NOTE - NSBHATTESTBILLINGAW_PSY_A_CORE
[id number] Inpatient care - moderate complexity - 25 minutes
[id number] Inpatient care - moderate complexity - 25 minutes
[id number] Inpatient care - moderate complexity - 25 minutes

## 2023-01-03 NOTE — BH INPATIENT PSYCHIATRY PROGRESS NOTE - NSBHFUPINTERVALHXFT_PSY_A_CORE
Pt seen in the day room . The pt is slowly beginning to appear less depressed and less tense and anxious. The pt is tolerating his  current med regimen of Lexapro 25 mg po q am and now titrated Lamictal dose of  200 mg po q am and added dose of Lamictal  50  mg po qhs  . The pt denies any adverse effects with either medication and he denies any rash with Lamictal. The pt spoke of his 's initial preference for the pt. to not be admitted to hospital though the pt himself spoke of his belief that he had, in fact made the right decision to be admitted acutely with plan for follow up outpatient   treatment as part of the pt's after care treatment plan.    The pt denies current SI /HI and he appears more spontaneous and animated in his interactions  in therapy group and with select staff members and peers.   Judgment and insight continue to slowly  improve with multimodal treatment. The pt reported having spoken by phone with his  and the pt's face lights up when he speaks of their 6 yr-old son Orestes with whom the pt has spoken with by phone as well.  The pt gave verbal consent for this  writer to speak with the pt's  Damon as well.

## 2023-01-03 NOTE — BH INPATIENT PSYCHIATRY PROGRESS NOTE - OTHER
slowly normalizing affect gradually increasing in range and intensity " I feel better." slowly less depressed and anxious appearing recent intensifying  SI in the context of worsening mood lability

## 2023-01-04 PROCEDURE — 99232 SBSQ HOSP IP/OBS MODERATE 35: CPT

## 2023-01-04 RX ORDER — LAMOTRIGINE 25 MG/1
2 TABLET, ORALLY DISINTEGRATING ORAL
Qty: 28 | Refills: 1
Start: 2023-01-04 | End: 2023-01-31

## 2023-01-04 RX ORDER — LAMOTRIGINE 25 MG/1
1 TABLET, ORALLY DISINTEGRATING ORAL
Qty: 14 | Refills: 1
Start: 2023-01-04 | End: 2023-01-31

## 2023-01-04 RX ORDER — LAMOTRIGINE 25 MG/1
1 TABLET, ORALLY DISINTEGRATING ORAL
Qty: 0 | Refills: 1 | DISCHARGE

## 2023-01-04 RX ORDER — ESCITALOPRAM OXALATE 10 MG/1
5 TABLET, FILM COATED ORAL
Qty: 70 | Refills: 1
Start: 2023-01-04 | End: 2023-01-31

## 2023-01-04 RX ADMIN — ESCITALOPRAM OXALATE 25 MILLIGRAM(S): 10 TABLET, FILM COATED ORAL at 10:18

## 2023-01-04 RX ADMIN — Medication 1 TABLET(S): at 10:18

## 2023-01-04 RX ADMIN — LAMOTRIGINE 200 MILLIGRAM(S): 25 TABLET, ORALLY DISINTEGRATING ORAL at 10:17

## 2023-01-04 RX ADMIN — LAMOTRIGINE 50 MILLIGRAM(S): 25 TABLET, ORALLY DISINTEGRATING ORAL at 20:59

## 2023-01-04 NOTE — BH INPATIENT PSYCHIATRY PROGRESS NOTE - NSBHFUPINTERVALCCFT_PSY_A_CORE
" I just think I needed to come in to the hospital because the past several weeks were becoming crisis time for me and my depression was getting worse."
 " I think I'm feeling pretty good. No more mood swings so far. "
 " I think I'm feeling pretty good. No more mood swings so far. "
 " I'm doing alright. I don't really notice any changes one way or the other yet."

## 2023-01-04 NOTE — BH INPATIENT PSYCHIATRY PROGRESS NOTE - NSTXDEPRESINTERMD_PSY_ALL_CORE
1. Continue Lexapro 25 mg po q am  2.To titrate Lamictal to 200 mg po q am and 50 mg po qhs to further stabilize the pt's affective volatility   3.Pt encouraged to attend therapy groups as tolerated  4.Disposition planning ongoing
1. Continue Lexapro 25 mg po q am  2.To titrate Lamictal to 200 mg po q am and 50 mg po qhs to further stabilize the pt's affective volatility   3.Pt encouraged to attend therapy groups as tolerated  4.Disposition planning ongoing

## 2023-01-04 NOTE — BH INPATIENT PSYCHIATRY PROGRESS NOTE - NSTXDCOTHRGOAL_PSY_ALL_CORE
Pt. will have appt. for ongoing mental health care within 5-7 days of discharge  Pt. will have safe housing upon discharge.  Social work will follow up to insure Pts. benefits are in place  SW to explore with pt. the interest and need for substance use referral for discharge

## 2023-01-04 NOTE — BH DISCHARGE NOTE NURSING/SOCIAL WORK/PSYCH REHAB - PATIENT PORTAL LINK FT
You can access the FollowMyHealth Patient Portal offered by [facility] by registering at the following website: [email or web address]. By joining [facility]’s FollowMyHealth portal, you will also be able to view your health information using other applications (apps) compatible with our system.

## 2023-01-04 NOTE — BH DISCHARGE NOTE NURSING/SOCIAL WORK/PSYCH REHAB - NSDCADDINFO1FT_PSY_ALL_CORE
You have an in person appt with Dr. Don on 1/12 @ 11:30am for medication management. Please discuss transferring therapists with office.

## 2023-01-04 NOTE — BH INPATIENT PSYCHIATRY PROGRESS NOTE - NSICDXBHSECONDARYDX_PSY_ALL_CORE
AGNIESZKA (generalized anxiety disorder)   F41.1  

## 2023-01-04 NOTE — BH INPATIENT PSYCHIATRY PROGRESS NOTE - PRN MEDS
MEDICATIONS  (PRN):  acetaminophen     Tablet .. 650 milliGRAM(s) Oral every 6 hours PRN Temp greater or equal to 38C (100.4F), Mild Pain (1 - 3), Moderate Pain (4 - 6)  aluminum hydroxide/magnesium hydroxide/simethicone Suspension 30 milliLiter(s) Oral every 6 hours PRN Dyspepsia  hydrOXYzine hydrochloride 50 milliGRAM(s) Oral every 6 hours PRN Anxiety  magnesium hydroxide Suspension 30 milliLiter(s) Oral daily PRN Constipation  

## 2023-01-04 NOTE — BH INPATIENT PSYCHIATRY PROGRESS NOTE - OTHER
slowly less depressed and anxious appearing affect gradually increasing in range and intensity recent intensifying  SI in the context of worsening mood lability " I feel better." slowly normalizing

## 2023-01-04 NOTE — BH INPATIENT PSYCHIATRY PROGRESS NOTE - CURRENT MEDICATION
MEDICATIONS  (STANDING):  escitalopram 25 milliGRAM(s) Oral daily  lamoTRIgine 50 milliGRAM(s) Oral at bedtime  lamoTRIgine 200 milliGRAM(s) Oral daily  multivitamin 1 Tablet(s) Oral daily    MEDICATIONS  (PRN):  acetaminophen     Tablet .. 650 milliGRAM(s) Oral every 6 hours PRN Temp greater or equal to 38C (100.4F), Mild Pain (1 - 3), Moderate Pain (4 - 6)  aluminum hydroxide/magnesium hydroxide/simethicone Suspension 30 milliLiter(s) Oral every 6 hours PRN Dyspepsia  hydrOXYzine hydrochloride 50 milliGRAM(s) Oral every 6 hours PRN Anxiety  magnesium hydroxide Suspension 30 milliLiter(s) Oral daily PRN Constipation  
MEDICATIONS  (STANDING):  escitalopram 25 milliGRAM(s) Oral daily  lamoTRIgine 25 milliGRAM(s) Oral at bedtime  lamoTRIgine 200 milliGRAM(s) Oral daily  multivitamin 1 Tablet(s) Oral daily    MEDICATIONS  (PRN):  acetaminophen     Tablet .. 650 milliGRAM(s) Oral every 6 hours PRN Temp greater or equal to 38C (100.4F), Mild Pain (1 - 3), Moderate Pain (4 - 6)  aluminum hydroxide/magnesium hydroxide/simethicone Suspension 30 milliLiter(s) Oral every 6 hours PRN Dyspepsia  hydrOXYzine hydrochloride 50 milliGRAM(s) Oral every 6 hours PRN Anxiety  magnesium hydroxide Suspension 30 milliLiter(s) Oral daily PRN Constipation

## 2023-01-04 NOTE — BH INPATIENT PSYCHIATRY PROGRESS NOTE - NSBHMETABOLIC_PSY_ALL_CORE_FT
BMI: BMI (kg/m2): 30.8 (12-30-22 @ 17:55)  HbA1c: A1C with Estimated Average Glucose Result: 6.3 % (12-31-22 @ 08:10)    Glucose:   BP: 139/91 (12-30-22 @ 14:49) (139/91 - 139/91)  Lipid Panel: Date/Time: 12-31-22 @ 08:10  Cholesterol, Serum: 192  Direct LDL: --  HDL Cholesterol, Serum: 37  Total Cholesterol/HDL Ration Measurement: --  Triglycerides, Serum: 223  
BMI: BMI (kg/m2): 30.8 (12-30-22 @ 17:55)  HbA1c: A1C with Estimated Average Glucose Result: 6.3 % (12-31-22 @ 08:10)    Glucose:   BP: --  Lipid Panel: Date/Time: 12-31-22 @ 08:10  Cholesterol, Serum: 192  Direct LDL: --  HDL Cholesterol, Serum: 37  Total Cholesterol/HDL Ration Measurement: --  Triglycerides, Serum: 223  

## 2023-01-04 NOTE — BH INPATIENT PSYCHIATRY PROGRESS NOTE - NSTXDEPRESGOAL_PSY_ALL_CORE
Exhibit improvements in self-grooming, hygiene, sleep and appetite

## 2023-01-04 NOTE — BH DISCHARGE NOTE NURSING/SOCIAL WORK/PSYCH REHAB - NSCDUDCCRISIS_PSY_A_CORE
[facility] Well  1 (096) [facility]-WELL ([phone])  Text "WELL" to 71481  Website: www.[employer]/.[facility] 1 (918) 621-HOPE (4045) Website: www.[facility]izon.org/.  [facility] - [facility]ASH – Crisis Care for Children, Adults and Families  [address] [place] [address]  Mobile Crisis Hotline – (530) 918-3450/.National Suicide Prevention Lifeline 1 (609) 220-9505/.  [facility]  1 (379) LIF[facility] (314-2177)/.  [facility]  (304) 101-4953/.  [facility] Behavioral Health Helpline / [facility] Mobile Crisis  (708) 044-DPPB (2081)/.  [facility] Response Crisis Hotline  (952) 287-4147  24 hour telephone crisis intervention and suicide prevention hotline concerned with all mental health issues/.  [facility]  [address] [place] [address] [phone]   Hours:  Monday through Friday from 9 AM to 3 PM/Other.../988 Suicide and Crisis Lifeline

## 2023-01-04 NOTE — BH INPATIENT PSYCHIATRY PROGRESS NOTE - NSBHCHARTREVIEWVS_PSY_A_CORE FT
Vital Signs Last 24 Hrs  T(C): 36.8 (01-01-23 @ 07:58), Max: 36.8 (01-01-23 @ 07:58)  T(F): 98.3 (01-01-23 @ 07:58), Max: 98.3 (01-01-23 @ 07:58)  HR: --  BP: --  BP(mean): --  RR: 16 (01-01-23 @ 07:58) (16 - 16)  SpO2: 100% (01-01-23 @ 07:58) (100% - 100%)    Orthostatic VS  01-01-23 @ 07:58  Lying BP: --/-- HR: --  Sitting BP: 135/82 HR: 86  Standing BP: 128/85 HR: 87  Site: upper right arm  Mode: electronic  Orthostatic VS  12-31-22 @ 07:22  Lying BP: --/-- HR: --  Sitting BP: 129/69 HR: 83  Standing BP: 121/73 HR: 84  Site: upper right arm  Mode: electronic  Orthostatic VS  12-30-22 @ 17:55  Lying BP: --/-- HR: --  Sitting BP: 134/84 HR: 82  Standing BP: 129/68 HR: 86  Site: --  Mode: --  
Vital Signs Last 24 Hrs  T(C): 36.7 (01-03-23 @ 07:28), Max: 36.7 (01-03-23 @ 07:28)  T(F): 98 (01-03-23 @ 07:28), Max: 98 (01-03-23 @ 07:28)  HR: --  BP: --  BP(mean): --  RR: 18 (01-03-23 @ 07:28) (18 - 18)  SpO2: 100% (01-03-23 @ 07:28) (100% - 100%)    Orthostatic VS  01-03-23 @ 07:28  Lying BP: --/-- HR: --  Sitting BP: 141/84 HR: 84  Standing BP: 134/81 HR: 83  Site: upper right arm  Mode: electronic  Orthostatic VS  01-02-23 @ 08:05  Lying BP: --/-- HR: --  Sitting BP: 136/81 HR: 82  Standing BP: 125/74 HR: 92  Site: upper right arm  Mode: electronic  
Vital Signs Last 24 Hrs  T(C): 36.3 (01-02-23 @ 08:05), Max: 36.3 (01-02-23 @ 08:05)  T(F): 97.4 (01-02-23 @ 08:05), Max: 97.4 (01-02-23 @ 08:05)  HR: --  BP: --  BP(mean): --  RR: 18 (01-02-23 @ 08:05) (18 - 18)  SpO2: 100% (01-02-23 @ 08:05) (100% - 100%)    Orthostatic VS  01-02-23 @ 08:05  Lying BP: --/-- HR: --  Sitting BP: 136/81 HR: 82  Standing BP: 125/74 HR: 92  Site: upper right arm  Mode: electronic  Orthostatic VS  01-01-23 @ 07:58  Lying BP: --/-- HR: --  Sitting BP: 135/82 HR: 86  Standing BP: 128/85 HR: 87  Site: upper right arm  Mode: electronic  
Vital Signs Last 24 Hrs  T(C): 36.7 (01-04-23 @ 07:28), Max: 36.7 (01-04-23 @ 07:28)  T(F): 98 (01-04-23 @ 07:28), Max: 98 (01-04-23 @ 07:28)  HR: --  BP: --  BP(mean): --  RR: 16 (01-04-23 @ 07:28) (16 - 16)  SpO2: 100% (01-04-23 @ 07:28) (100% - 100%)    Orthostatic VS  01-04-23 @ 07:28  Lying BP: --/-- HR: --  Sitting BP: 128/71 HR: 84  Standing BP: 124/61 HR: 87  Site: upper right arm  Mode: electronic  Orthostatic VS  01-03-23 @ 07:28  Lying BP: --/-- HR: --  Sitting BP: 141/84 HR: 84  Standing BP: 134/81 HR: 83  Site: upper right arm  Mode: electronic

## 2023-01-04 NOTE — BH INPATIENT PSYCHIATRY PROGRESS NOTE - NSBHMSETHTCONTENT_PSY_A_CORE
Preoccupations/Ruminations/Guilt/Suicidality/Other
Preoccupations/Ruminations/Other
Preoccupations/Ruminations/Other
Preoccupations/Ruminations/Guilt/Suicidality/Other

## 2023-01-04 NOTE — BH INPATIENT PSYCHIATRY PROGRESS NOTE - NSBHFUPINTERVALHXFT_PSY_A_CORE
Pt seen in the day room . The pt continues to report gradual clinical improvement and he reports he has not had any recurrent affective dysregulated episodes since his brief admission  to hospital, though the pt concedes that the inpatient hospital unit is not equivalent to his normal community and family setting. The pt is slowly beginning to appear less depressed and less tense and anxious. The pt is tolerating his  current med regimen of Lexapro 25 mg po q am and now titrated Lamictal dose of  200 mg po q am and added dose of Lamictal  50  mg po qhs  . The pt denies any adverse effects with either medication and he denies any rash with Lamictal. The pt spoke of his 's initial preference for the pt. to not be admitted to hospital though the pt himself spoke of his belief that he had, in fact made the right decision to be admitted acutely with plan for follow up outpatient   treatment as part of the pt's after care treatment plan.    The pt denies current SI /HI and he appears more spontaneous and animated in his interactions  in therapy group and with select staff members and peers.   Judgment and insight continue to slowly  improve with multimodal treatment. The pt reported having spoken by phone with his  and the pt's face lights up when he speaks of their 6 yr-old son Orestes with whom the pt has spoken with by phone as well.  The pt gave verbal consent for this  writer to speak with the pt's  Damon as well.

## 2023-01-04 NOTE — BH INPATIENT PSYCHIATRY PROGRESS NOTE - NSBHMSEKNOW_PSY_A_CORE
HPI:   Patient is a 85y male with a past history of HTN, CAD, low Ef(20%), A fib, BPH,CVA with left sided weakness,  DVT of B/L lower extremities, IVC filter, and chronic dubois who was admitted 11/14 with hypoxic respiratory failure secondary to sepsis.  He was covered with cefepime and has been stabilized. He is a DNR/DNI but would like medical measures.  His blood cultures have been negative, he remains confused, but he has been hemodynamically stable and was moved to floor today.  He is confused, unable to give a history. He has a persistent leukocytosis which is of concern.    REVIEW OF SYSTEMS:  All other review of systems negative (Comprehensive ROS): limited by condition    PAST MEDICAL & SURGICAL HISTORY:  Chronic atrial fibrillation      History of cardiomyopathy      CAD (coronary artery disease)      BPH with obstruction/lower urinary tract symptoms      Hyperlipidemia      History of CVA (cerebrovascular accident)  left sided weakness      S/P CABG (coronary artery bypass graft)          Allergies    PC Pen VK (Other)    Intolerances        Antimicrobials Day #  :day 5  cefepime   IVPB 1000 milliGRAM(s) IV Intermittent <User Schedule>    Other Medications:  aMIOdarone    Tablet 200 milliGRAM(s) Oral daily  aspirin  chewable 81 milliGRAM(s) Oral daily  furosemide    Tablet 20 milliGRAM(s) Oral daily  heparin   Injectable 5000 Unit(s) SubCutaneous every 12 hours  levothyroxine 150 MICROGram(s) Oral daily  metoprolol tartrate 12.5 milliGRAM(s) Oral every 12 hours  midodrine 10 milliGRAM(s) Oral every 8 hours  pantoprazole    Tablet 40 milliGRAM(s) Oral before breakfast  sacubitril 24 mG/valsartan 26 mG 1 Tablet(s) Oral two times a day      FAMILY HISTORY: N/A      SOCIAL HISTORY:  Smoking:  x   ETOH:x     Drug Use: x       T(F): 98.1 (11-19-22 @ 12:09), Max: 98.1 (11-19-22 @ 12:09)  HR: 70 (11-19-22 @ 12:09)  BP: 116/70 (11-19-22 @ 12:09)  RR: 16 (11-19-22 @ 12:09)  SpO2: 98% (11-19-22 @ 12:09)  Wt(kg): --    PHYSICAL EXAM:  General: alert, no acute distress, chronically ill appearing  Eyes:  anicteric, no conjunctival injection, no discharge  Oropharynx: no lesions or injection 	  Neck: supple, without adenopathy  Lungs: clear to auscultation, decreased at bases  Heart: irregular rate and rhythm;   Abdomen: soft, nondistended, nontender, without mass or organomegaly  Skin: no rash  Extremities: no clubbing, cyanosis, = edema  Neurologic: alert, confused, functional quadriplegia  Rt lateral foot with skin loss, ? exposed tendon vs devitalized tissue    LAB RESULTS:                        8.7    20.04 )-----------( 290      ( 19 Nov 2022 05:30 )             28.9     11-19    141  |  105  |  27<H>  ----------------------------<  98  3.9   |  28  |  1.04    Ca    8.9      19 Nov 2022 05:30  Phos  2.0     11-18  Mg     1.9     11-18    TPro  6.2  /  Alb  2.5<L>  /  TBili  0.5  /  DBili  x   /  AST  116<H>  /  ALT  278<H>  /  AlkPhos  206<H>  11-18    LIVER FUNCTIONS - ( 18 Nov 2022 06:00 )  Alb: 2.5 g/dL / Pro: 6.2 g/dL / ALK PHOS: 206 U/L / ALT: 278 U/L / AST: 116 U/L / GGT: x               MICROBIOLOGY:  RECENT CULTURES:  11-15 @ 03:20 Clean Catch Clean Catch (Midstream) Pseudomonas aeruginosa  Enterococcus faecium    >100,000 CFU/ml Pseudomonas aeruginosa  >100,000 CFU/ml Enterococcus faecium      11-14 @ 19:45 .Blood Blood-Peripheral     No growth to date.            RADIOLOGY REVIEWED:  < from: Xray Chest 1 View-PORTABLE IMMEDIATE (11.14.22 @ 21:21) >  Impression:    Possible early CHF.    < end of copied text >  < from: US Kidney and Bladder (10.26.22 @ 15:12) >  IMPRESSION:  No hydronephrosis bilaterally.  Limited bladder evaluation. Bladder debris and diffuse wall thickening.   Correlate clinically and with cystoscopy as warranted    < end of copied text >  
Normal

## 2023-01-04 NOTE — BH DISCHARGE NOTE NURSING/SOCIAL WORK/PSYCH REHAB - NSDCPLANREVIEWED_PSY_ALL_CORE
The discharge note was reviewed with the patient and the patient has agreed to receive a copy of the discharge note through the patient portal./Yes

## 2023-01-04 NOTE — BH INPATIENT PSYCHIATRY PROGRESS NOTE - NSBHMSESPABN_PSY_A_CORE
Soft volume/Slowed rate
Soft volume/Slowed rate/Other
Soft volume/Slowed rate/Other
Soft volume/Slowed rate

## 2023-01-05 VITALS — RESPIRATION RATE: 16 BRPM | TEMPERATURE: 98 F | OXYGEN SATURATION: 98 %

## 2023-01-05 PROCEDURE — 99232 SBSQ HOSP IP/OBS MODERATE 35: CPT

## 2023-01-05 RX ADMIN — ESCITALOPRAM OXALATE 25 MILLIGRAM(S): 10 TABLET, FILM COATED ORAL at 10:15

## 2023-01-05 RX ADMIN — LAMOTRIGINE 200 MILLIGRAM(S): 25 TABLET, ORALLY DISINTEGRATING ORAL at 10:14

## 2023-01-05 RX ADMIN — Medication 1 TABLET(S): at 10:15

## 2023-01-05 NOTE — BH INPATIENT PSYCHIATRY DISCHARGE NOTE - NSDCCPCAREPLAN_GEN_ALL_CORE_FT
PRINCIPAL DISCHARGE DIAGNOSIS  Diagnosis: Bipolar disorder  Assessment and Plan of Treatment:       SECONDARY DISCHARGE DIAGNOSES  Diagnosis: AGNIESZKA (generalized anxiety disorder)  Assessment and Plan of Treatment:

## 2023-01-05 NOTE — BH INPATIENT PSYCHIATRY DISCHARGE NOTE - NSDCPROCEDURESFT_PSY_ALL_CORE
TSH=0.65  Fasting lipids  Cholesterol = 192  YD=249  GmZ9M=2.3  Mildly elevated transaminases  TRC=929  AU=872  SARS CO V 2NOT DETECTED 12/30/2022  Urine tox screen negative for substances of potential abuse  EKG  VR= 61  QT /JXv=045/446  No studies are pending

## 2023-01-05 NOTE — BH INPATIENT PSYCHIATRY DISCHARGE NOTE - NSBHMETABOLIC_PSY_ALL_CORE_FT
BMI: BMI (kg/m2): 30.8 (12-30-22 @ 17:55)  HbA1c: A1C with Estimated Average Glucose Result: 6.3 % (12-31-22 @ 08:10)  Lipid Panel: Date/Time: 12-31-22 @ 08:10  Cholesterol, Serum: 192  Direct LDL: --  HDL Cholesterol, Serum: 37  Total Cholesterol/HDL Ration Measurement: --  Triglycerides, Serum: 223

## 2023-01-05 NOTE — BH INPATIENT PSYCHIATRY DISCHARGE NOTE - HOSPITAL COURSE
Interval Chief Complaint  " I think I'm feeling pretty good. No more mood swings so far. "  Interval History  Pt seen in the day room . The pt continues to report gradual clinical improvement and he reports he has not had any recurrent affective dysregulated episodes since his brief admission  to hospital, though the pt concedes that the inpatient hospital unit is not equivalent to his normal community and family setting. The pt is slowly beginning to appear less depressed and less tense and anxious. The pt is tolerating his  current med regimen of Lexapro 25 mg po q am and now titrated Lamictal dose of  200 mg po q am and added dose of Lamictal  50  mg po qhs  . The pt denies any adverse effects with either medication and he denies any rash with Lamictal. The pt spoke of his 's initial preference for the pt. to not be admitted to hospital though the pt himself spoke of his belief that he had, in fact made the right decision to be admitted acutely with plan for follow up outpatient   treatment as part of the pt's after care treatment plan.    The pt denies current SI /HI and he appears more spontaneous and animated in his interactions  in therapy group and with select staff members and peers.   Judgment and insight continue to slowly  improve with multimodal treatment. The pt reported having spoken by phone with his  and the pt's face lights up when he speaks of their 6 yr-old son Orestes with whom the pt has spoken with by phone as well.  The pt gave verbal consent for this  writer to speak with the pt's  Damon as well.  Vital Signs Reviewed  Yes

## 2023-01-05 NOTE — BH INPATIENT PSYCHIATRY DISCHARGE NOTE - NSDCMRMEDTOKEN_GEN_ALL_CORE_FT
escitalopram 5 mg oral tablet: 5 tab(s) orally once a day  lamoTRIgine 200 mg oral tablet: 1 tab(s) orally once a day  lamoTRIgine 25 mg oral tablet: 2 tab(s) orally once a day (at bedtime)  Multiple Vitamins oral tablet: 1 tab(s) orally once a day  Pepcid 20 mg oral tablet: 1 tab(s) orally 2 times a day

## 2023-01-05 NOTE — BH INPATIENT PSYCHIATRY DISCHARGE NOTE - NSDCRECOMMENDLABFT_PSY_ALL_CORE
Fasting lipids, HgA1C , CBC with diff, CMP q 6 monthly with current med regimen and lipid/ blood glucose  elevation  Monthly monitoring of vital signs including weight and waist circumference as above

## 2023-01-05 NOTE — BH INPATIENT PSYCHIATRY DISCHARGE NOTE - HPI (INCLUDE ILLNESS QUALITY, SEVERITY, DURATION, TIMING, CONTEXT, MODIFYING FACTORS, ASSOCIATED SIGNS AND SYMPTOMS)
The pt. is a  37 year- old alcantara WM employed at [employer] the past 7 years, domiciled with  and their 4 year old son in a private residence, Patient has a history of two prior psychiatric hospitalizations, last hospitalization was at  from 3/13/18 - 3/20/18 for depression and prior psychiatric hospitalization was at  in 2013. PPH includes anxiety, bipolar depression and borderline personality disorder. Today the patient presented to the ED with c/o worsening SI yesterday with plan after a fight with his family. He also reports symptoms including urges to self harm, last cut a few days ago, superficial. Psychiatry consulted for suicidal ideation.     Pt is currently in treatment with psychiatrist and therapist through telehealth, previously he was in treatment at [facility] but discontinued treatment when they transitioned to virtual visits because of the Covid-19 pandemic. The patient reports adherence to medication prescribed (Lexapro, and Lamictal). No known history of SA, no hx of aggression/violence and no hx of substance abuse or legal issues. Denies access to firearms. Denies current or recent manic sx. Denies current and history of psychotic sx. No substance abuse hx; does not use any illicit substances and drinks about once a month. Reports that after a fight about finances with his in-laws he had suicidal ideation with plan, stating "I am having suicidal ideations, yesterday morning I was going to take a bottle of my pills and hope that it would effect my organs and I would die slowly." Patients  has been home with him but patient does not feel safe alone and knows he cannot be "watched" forever by his . Reporting that once  was not allowed in ED he had a thought to elope and end his life because he had the opportunity to be alone and complete suicide.

## 2023-01-05 NOTE — BH INPATIENT PSYCHIATRY DISCHARGE NOTE - NSBHDCRISKMITIGATEFT_PSY_ALL_CORE
The pt is amenable to outpatient follow up treatment in the community with either Dr. Don or MIC Thornton on 1/12/2023 at 11:30 am in [place]

## 2023-01-06 NOTE — CHART NOTE - NSCHARTNOTEFT_GEN_A_CORE
Arnulfo returned our call and is home safely, has his medication and is not having suicidal thoughts.

## 2023-01-11 DIAGNOSIS — F60.3 BORDERLINE PERSONALITY DISORDER: ICD-10-CM

## 2023-01-11 DIAGNOSIS — K76.0 FATTY (CHANGE OF) LIVER, NOT ELSEWHERE CLASSIFIED: ICD-10-CM

## 2023-01-11 DIAGNOSIS — G47.33 OBSTRUCTIVE SLEEP APNEA (ADULT) (PEDIATRIC): ICD-10-CM

## 2023-01-11 DIAGNOSIS — K21.9 GASTRO-ESOPHAGEAL REFLUX DISEASE WITHOUT ESOPHAGITIS: ICD-10-CM

## 2023-01-11 DIAGNOSIS — F31.9 BIPOLAR DISORDER, UNSPECIFIED: ICD-10-CM

## 2023-01-11 DIAGNOSIS — Z20.822 CONTACT WITH AND (SUSPECTED) EXPOSURE TO COVID-19: ICD-10-CM

## 2023-01-11 DIAGNOSIS — R45.851 SUICIDAL IDEATIONS: ICD-10-CM

## 2023-01-11 DIAGNOSIS — F41.1 GENERALIZED ANXIETY DISORDER: ICD-10-CM

## 2023-01-11 DIAGNOSIS — D56.3 THALASSEMIA MINOR: ICD-10-CM

## 2023-02-27 ENCOUNTER — APPOINTMENT (OUTPATIENT)
Dept: NEUROLOGY | Facility: CLINIC | Age: 38
End: 2023-02-27
Payer: COMMERCIAL

## 2023-02-27 DIAGNOSIS — R20.2 PARESTHESIA OF SKIN: ICD-10-CM

## 2023-02-27 PROCEDURE — 99213 OFFICE O/P EST LOW 20 MIN: CPT | Mod: 95

## 2023-02-27 NOTE — DATA REVIEWED
[de-identified] : MRI brain 7/17/19: Unremarkable [de-identified] : Blood tests 10/25/18: TSH 1.27 \par blood tests 7/3/19: TSH 0.6, Hb 11.9 with MCV 65, MCH 19.6, MCHC 30.2, ferritin 326, vitamin B12 404\par \par Polysomnogram 9/9/19: Mild PRASANNA with AHI of 12.5, REM latency 179.5\par MSLT 9/10/19: Man sleep latency 8.2 minutes. NO sleep onset REM periods\par \par CPAP titration report 10/7/19: effective at CPAP 7 cm H2O using a [employer]plus Full face mask size small\par \par Home sleep study 1/5/21: AHI 9.9. Supine AHI 19.9. Non-supine AHI 3.3\par \par MRI cervical and thoracic spine 8/8/22:\par Arnulfo Alvarez is a 36 year old man with chronic hypersomnia.\par \par PRASANNA - He is s/p tonsillectomy.\par Repeat HST showed mild PRASANNA. However, this is more significant in the supine position and he does not typically sleep in the supine position.\par He has received a replacement machine but has not been compliant.\par Advised to start use of CPAP again and gradually increase use after desensitization.\par \par  Hypersomnia - He has hypersomnia despite adequately treated PARSANNA in the past.\par Repeat PSG and MSLT were not suggestive of Narcolepsy, even when PSG was done without CPAP.\par His presentation is more suggestive of idiopathic hypersomnia given his sleep inertia.\par MRI brain was unremarkable.\par Provigil was not effective in the past.\par Adderall was effective but has had some difficulty sleeping previously.\par Trial of changing dosage to Adderall ER 20 mg with 10 mg immediate release. My hope is that this will help to take effect more quickly (which is a complaint he has - too slow to work) but may not linger as much into the night.\par \par Paresthesias\par -Upper extremity paresthesias\par -Brisk reflexes on exam\par -Will check MRI cervical and thoracic spine\par -TSH, vitamin B12, vitamin B6\par -Normal MRI brain in July 2019.\par \par \par

## 2023-02-27 NOTE — DISCUSSION/SUMMARY
[FreeTextEntry1] : Arnulfo Alvarez is a 37 year old man with chronic hypersomnia.\par \par PRASANNA - He is s/p tonsillectomy.\par Repeat HST showed mild PRASANNA.\par His partner does report snoring, loud at times.\par Advised nightly CPAP use.\par \par Hypersomnia - He has had chronic hypersomnia (even with treatment of PRASANNA in the past). \par Repeat PSG and MSLT were not suggestive of Narcolepsy, even when PSG was done without CPAP.\par His presentation is more suggestive of idiopathic hypersomnia given his sleep inertia.\par MRI brain was unremarkable.\par Provigil was not effective in the past.\par Adderall has been most effective but he has more side effects than with Sunosi.\par -Continue Sunosi 150 mg/day.\par \par Paresthesias\par -Upper extremity paresthesias\par -Brisk reflexes on exam\par -MRI cervical and thoracic spine showed multilevel spondylosis, with some cord impingement. He is asymptomatic at this time. Advised to avoid activities such as chiropractic manipulations, roller coasters and other activities that may cause sudden jerking movements in the spine. \par Low marrow signal on MRI is likely related to anemia (he has thalassemia).\par \par -TSH, vitamin B12, vitamin B6\par -Normal MRI brain in July 2019.\par \par f/u 6 months, sooner if needed.\par \par \par

## 2023-02-27 NOTE — HISTORY OF PRESENT ILLNESS
[Other Location: e.g. School (Enter Location, City,State)___] : at [unfilled], at the time of the visit. [Medical Office: ([facility])___] : at the medical office located in  [FreeTextEntry1] : Last visit 1/27/22.\par \par He says that things are going well.\par He has not been taking Sunosi regularly. There was a six week period when he did not feel that he needed.\par He was [place] [address] [place] about two weeks at the end of December and early January.\par He has a diagnosis of Bipolar and states that emotions were "all over the place" since early December. \par He is not sure if he was manic/hypomanic.\par He states that his psychiatric symptoms were worsening prior to restarting Sunosi in mid-December.\par During his hospitalization the dose of Lamotrigine was increased. He is now taking 275 mg/day.\par He is also taking Lexapro 25 mg/day.\par For another few weeks after discharge he did not need Sunosi.\par He restarted Sunosi 1-2 weeks ago when he started to feel sleepy again. He fell asleep at work.\par He does find it to be effective. Adderall is more effective but he feels better overall with Sunosi.\par He is not aware of side effects.\par He reports sleeping for 7-9 hours per night.\par His significant other does note snoring, at times loud. \par He is using CPAP but not on a nightly basis.\par \par \par He was given a prescription for alprazolam by Dr. Don but has only taken it once.\par \par He denies having any paresthesias in his upper extremities.\par \par \par

## 2023-06-01 ENCOUNTER — TRANSCRIPTION ENCOUNTER (OUTPATIENT)
Age: 38
End: 2023-06-01

## 2023-06-07 ENCOUNTER — APPOINTMENT (OUTPATIENT)
Dept: OTOLARYNGOLOGY | Facility: CLINIC | Age: 38
End: 2023-06-07
Payer: COMMERCIAL

## 2023-06-07 VITALS
BODY MASS INDEX: 30.78 KG/M2 | WEIGHT: 215 LBS | SYSTOLIC BLOOD PRESSURE: 119 MMHG | HEIGHT: 70 IN | HEART RATE: 83 BPM | DIASTOLIC BLOOD PRESSURE: 77 MMHG

## 2023-06-07 DIAGNOSIS — E04.1 NONTOXIC SINGLE THYROID NODULE: ICD-10-CM

## 2023-06-07 PROCEDURE — 31575 DIAGNOSTIC LARYNGOSCOPY: CPT

## 2023-06-07 PROCEDURE — 99204 OFFICE O/P NEW MOD 45 MIN: CPT | Mod: 25

## 2023-06-07 RX ORDER — DEXTROAMPHETAMINE SACCHARATE, AMPHETAMINE ASPARTATE, DEXTROAMPHETAMINE SULFATE AND AMPHETAMINE SULFATE 2.5; 2.5; 2.5; 2.5 MG/1; MG/1; MG/1; MG/1
10 TABLET ORAL
Qty: 30 | Refills: 0 | Status: COMPLETED | COMMUNITY
Start: 2019-04-22 | End: 2023-06-07

## 2023-06-07 RX ORDER — DEXTROAMPHETAMINE SULFATE, DEXTROAMPHETAMINE SACCHARATE, AMPHETAMINE SULFATE AND AMPHETAMINE ASPARTATE 7.5; 7.5; 7.5; 7.5 MG/1; MG/1; MG/1; MG/1
30 CAPSULE, EXTENDED RELEASE ORAL
Qty: 30 | Refills: 0 | Status: COMPLETED | COMMUNITY
Start: 2019-04-22 | End: 2023-06-07

## 2023-06-07 NOTE — HISTORY OF PRESENT ILLNESS
[de-identified] : 36 y/o M presenting for sleep apnea x 10+ years. Initially diagnosed around 10 years, last sleep study was a home study 5 years ago. Prescribed CPAP machine about 10 years ago, reduces snoring but does not feel refreshed or that sleep quality has improved. Inquired previously on dental appliance but never used. Also admits to daytime relief and difficulty driving, takes senosi for excessive daytime sleepiness. Denies cigarette smoking or alcohol use. \par \par Patient has a BMI of  30.85

## 2023-06-07 NOTE — PHYSICAL EXAM
[Midline] : trachea located in midline position [Normal] : no rashes [de-identified] : Neck size 19 cm Nodular thyroid

## 2023-06-07 NOTE — CONSULT LETTER
[Dear  ___] : Dear  [unfilled], [Consult Letter:] : I had the pleasure of evaluating your patient, [unfilled]. [Please see my note below.] : Please see my note below. [Consult Closing:] : Thank you very much for allowing me to participate in the care of this patient.  If you have any questions, please do not hesitate to contact me. [Sincerely,] : Sincerely, [DrSea  ___] : Dr. RICCI [FreeTextEntry2] : Donald Milian MD [FreeTextEntry3] : Mago Jamison, GLADIS, PA-C\par Department of Otolaryngology\par [facility]\par Otolaryngology at [place]\par \par Jasson Whitehead MD, FACS \par Chief of Otolaryngology at [facility] \par  \par Dept. of Otolaryngology \par [facility]

## 2023-06-07 NOTE — PROCEDURE
[Unable to Cooperate with Mirror] : patient unable to cooperate with mirror [Obstruction] : acute airway obstruction evaluation [Flexible Endoscope] : examined with the flexible endoscope [Serial Number: ___] : Serial Number: [unfilled] [Lateral Wall ___ %] : the lateral wall was [unfilled]U% collapsed [Normal] : posterior cricoid area had healthy pink mucosa in the interarytenoid area and the esophageal inlet

## 2023-07-21 NOTE — ED STATDOCS - CONSTITUTIONAL [-], MLM
Prn lidocaine patch to right upper gluteal area, bengay applied to back, tums for report of indigestion, artificial tears for report of "dry eye" and saline nasal spray for "stuffy nose" all given with morning medications as ordered. no fever

## 2023-09-08 NOTE — REASON FOR VISIT
[Home] : at home, [unfilled] , at the time of the visit. [Medical Office: ([facility])___] : at the medical office located in  [Patient] : the patient [Follow-Up: _____] : a [unfilled] follow-up visit

## 2023-09-08 NOTE — DATA REVIEWED
[de-identified] : MRI brain 7/17/19: Unremarkable [de-identified] : Blood tests 10/25/18: TSH 1.27 \par  blood tests 7/3/19: TSH 0.6, Hb 11.9 with MCV 65, MCH 19.6, MCHC 30.2, ferritin 326, vitamin B12 404\par  \par  Polysomnogram 9/9/19: Mild PRASANNA with AHI of 12.5, REM latency 179.5\par  MSLT 9/10/19: Man sleep latency 8.2 minutes. NO sleep onset REM periods\par  \par  CPAP titration report 10/7/19: effective at CPAP 7 cm H2O using a Simplus Full face mask size small\par  \par  Home sleep study 1/5/21: AHI 9.9. Supine AHI 19.9. Non-supine AHI 3.3\par  \par  MRI cervical and thoracic spine 8/8/22:\par  Arnulfo Alvarez is a 36 year old man with chronic hypersomnia.\par  \par  PRASANNA - He is s/p tonsillectomy.\par  Repeat HST showed mild PRASANNA. However, this is more significant in the supine position and he does not typically sleep in the supine position.\par  He has received a replacement machine but has not been compliant.\par  Advised to start use of CPAP again and gradually increase use after desensitization.\par  \par   Hypersomnia - He has hypersomnia despite adequately treated PRASANNA in the past.\par  Repeat PSG and MSLT were not suggestive of Narcolepsy, even when PSG was done without CPAP.\par  His presentation is more suggestive of idiopathic hypersomnia given his sleep inertia.\par  MRI brain was unremarkable.\par  Provigil was not effective in the past.\par  Adderall was effective but has had some difficulty sleeping previously.\par  Trial of changing dosage to Adderall ER 20 mg with 10 mg immediate release. My hope is that this will help to take effect more quickly (which is a complaint he has - too slow to work) but may not linger as much into the night.\par  \par  Paresthesias\par  -Upper extremity paresthesias\par  -Brisk reflexes on exam\par  -Will check MRI cervical and thoracic spine\par  -TSH, vitamin B12, vitamin B6\par  -Normal MRI brain in July 2019.\par  \par  \par

## 2023-09-08 NOTE — DISCUSSION/SUMMARY
[FreeTextEntry1] : Arnulfo Alvarez is a 37 year old man with chronic hypersomnia.\par  \par  PRASANNA - He is s/p tonsillectomy.\par  Repeat HST showed mild PRASANNA.\par  His partner does report snoring, loud at times.\par  Advised nightly CPAP use.\par  \par  Hypersomnia - He has had chronic hypersomnia (even with treatment of RPASANNA in the past). \par  Repeat PSG and MSLT were not suggestive of Narcolepsy, even when PSG was done without CPAP.\par  His presentation is more suggestive of idiopathic hypersomnia given his sleep inertia.\par  MRI brain was unremarkable.\par  Provigil was not effective in the past.\par  Adderall has been most effective but he has more side effects than with Sunosi.\par  -Continue Sunosi 150 mg/day.\par  \par  Paresthesias\par  -Upper extremity paresthesias\par  -Brisk reflexes on exam\par  -MRI cervical and thoracic spine showed multilevel spondylosis, with some cord impingement. He is asymptomatic at this time. Advised to avoid activities such as chiropractic manipulations, roller coasters and other activities that may cause sudden jerking movements in the spine. \par  Low marrow signal on MRI is likely related to anemia (he has thalassemia).\par  \par  -TSH, vitamin B12, vitamin B6\par  -Normal MRI brain in July 2019.\par  \par  f/u 6 months, sooner if needed.\par  \par  \par

## 2023-09-11 ENCOUNTER — APPOINTMENT (OUTPATIENT)
Dept: NEUROLOGY | Facility: CLINIC | Age: 38
End: 2023-09-11
Payer: COMMERCIAL

## 2023-09-11 PROCEDURE — 99213 OFFICE O/P EST LOW 20 MIN: CPT | Mod: 95

## 2023-09-11 RX ORDER — SOLRIAMFETOL 150 MG/1
150 TABLET, FILM COATED ORAL
Qty: 30 | Refills: 5 | Status: ACTIVE | COMMUNITY
Start: 2020-12-16 | End: 1900-01-01

## 2023-09-11 RX ORDER — DEXTROAMPHETAMINE SACCHARATE, AMPHETAMINE ASPARTATE, DEXTROAMPHETAMINE SULFATE AND AMPHETAMINE SULFATE 5; 5; 5; 5 MG/1; MG/1; MG/1; MG/1
20 TABLET ORAL
Qty: 30 | Refills: 0 | Status: DISCONTINUED | COMMUNITY
Start: 2019-07-05 | End: 2023-09-11

## 2023-09-11 RX ORDER — DEXTROAMPHETAMINE SULFATE, DEXTROAMPHETAMINE SACCHARATE, AMPHETAMINE SULFATE AND AMPHETAMINE ASPARTATE 5; 5; 5; 5 MG/1; MG/1; MG/1; MG/1
20 CAPSULE, EXTENDED RELEASE ORAL
Qty: 30 | Refills: 0 | Status: DISCONTINUED | COMMUNITY
Start: 2022-06-27 | End: 2023-09-11

## 2023-09-11 RX ORDER — DEXTROAMPHETAMINE SACCHARATE, AMPHETAMINE ASPARTATE MONOHYDRATE, DEXTROAMPHETAMINE SULFATE AND AMPHETAMINE SULFATE 7.5; 7.5; 7.5; 7.5 MG/1; MG/1; MG/1; MG/1
30 CAPSULE, EXTENDED RELEASE ORAL
Qty: 30 | Refills: 0 | Status: DISCONTINUED | COMMUNITY
End: 2023-09-11

## 2023-09-11 RX ORDER — DEXTROAMPHETAMINE SACCHARATE, AMPHETAMINE ASPARTATE, DEXTROAMPHETAMINE SULFATE AND AMPHETAMINE SULFATE 2.5; 2.5; 2.5; 2.5 MG/1; MG/1; MG/1; MG/1
10 TABLET ORAL
Qty: 30 | Refills: 0 | Status: DISCONTINUED | COMMUNITY
Start: 2022-06-27 | End: 2023-09-11

## 2023-10-20 ENCOUNTER — TRANSCRIPTION ENCOUNTER (OUTPATIENT)
Age: 38
End: 2023-10-20

## 2023-11-22 ENCOUNTER — NON-APPOINTMENT (OUTPATIENT)
Age: 38
End: 2023-11-22

## 2023-11-23 ENCOUNTER — EMERGENCY (EMERGENCY)
Facility: HOSPITAL | Age: 38
LOS: 0 days | Discharge: HOME | End: 2023-11-23
Attending: EMERGENCY MEDICINE
Payer: COMMERCIAL

## 2023-11-23 VITALS
OXYGEN SATURATION: 96 % | RESPIRATION RATE: 17 BRPM | DIASTOLIC BLOOD PRESSURE: 86 MMHG | HEART RATE: 82 BPM | SYSTOLIC BLOOD PRESSURE: 124 MMHG | TEMPERATURE: 98 F

## 2023-11-23 VITALS
TEMPERATURE: 98 F | OXYGEN SATURATION: 97 % | HEART RATE: 92 BPM | RESPIRATION RATE: 16 BRPM | SYSTOLIC BLOOD PRESSURE: 132 MMHG | DIASTOLIC BLOOD PRESSURE: 85 MMHG

## 2023-11-23 DIAGNOSIS — R79.89 OTHER SPECIFIED ABNORMAL FINDINGS OF BLOOD CHEMISTRY: ICD-10-CM

## 2023-11-23 DIAGNOSIS — R11.2 NAUSEA WITH VOMITING, UNSPECIFIED: ICD-10-CM

## 2023-11-23 DIAGNOSIS — Z98.890 OTHER SPECIFIED POSTPROCEDURAL STATES: Chronic | ICD-10-CM

## 2023-11-23 DIAGNOSIS — K76.0 FATTY (CHANGE OF) LIVER, NOT ELSEWHERE CLASSIFIED: ICD-10-CM

## 2023-11-23 DIAGNOSIS — K80.20 CALCULUS OF GALLBLADDER WITHOUT CHOLECYSTITIS WITHOUT OBSTRUCTION: ICD-10-CM

## 2023-11-23 DIAGNOSIS — R10.9 UNSPECIFIED ABDOMINAL PAIN: ICD-10-CM

## 2023-11-23 DIAGNOSIS — Z87.09 PERSONAL HISTORY OF OTHER DISEASES OF THE RESPIRATORY SYSTEM: ICD-10-CM

## 2023-11-23 LAB
ALBUMIN SERPL ELPH-MCNC: 4.6 G/DL — SIGNIFICANT CHANGE UP (ref 3.3–5)
ALBUMIN SERPL ELPH-MCNC: 4.6 G/DL — SIGNIFICANT CHANGE UP (ref 3.3–5)
ALP SERPL-CCNC: 167 U/L — HIGH (ref 40–120)
ALP SERPL-CCNC: 167 U/L — HIGH (ref 40–120)
ALT FLD-CCNC: 249 U/L — HIGH (ref 12–78)
ALT FLD-CCNC: 249 U/L — HIGH (ref 12–78)
ANION GAP SERPL CALC-SCNC: 9 MMOL/L — SIGNIFICANT CHANGE UP (ref 5–17)
ANION GAP SERPL CALC-SCNC: 9 MMOL/L — SIGNIFICANT CHANGE UP (ref 5–17)
ANISOCYTOSIS BLD QL: SLIGHT — SIGNIFICANT CHANGE UP
ANISOCYTOSIS BLD QL: SLIGHT — SIGNIFICANT CHANGE UP
APPEARANCE UR: CLEAR — SIGNIFICANT CHANGE UP
APPEARANCE UR: CLEAR — SIGNIFICANT CHANGE UP
AST SERPL-CCNC: 255 U/L — HIGH (ref 15–37)
AST SERPL-CCNC: 255 U/L — HIGH (ref 15–37)
BACTERIA # UR AUTO: NEGATIVE /HPF — SIGNIFICANT CHANGE UP
BACTERIA # UR AUTO: NEGATIVE /HPF — SIGNIFICANT CHANGE UP
BASOPHILS # BLD AUTO: 0.07 K/UL — SIGNIFICANT CHANGE UP (ref 0–0.2)
BASOPHILS # BLD AUTO: 0.07 K/UL — SIGNIFICANT CHANGE UP (ref 0–0.2)
BASOPHILS NFR BLD AUTO: 0.7 % — SIGNIFICANT CHANGE UP (ref 0–2)
BASOPHILS NFR BLD AUTO: 0.7 % — SIGNIFICANT CHANGE UP (ref 0–2)
BILIRUB SERPL-MCNC: 1.6 MG/DL — HIGH (ref 0.2–1.2)
BILIRUB SERPL-MCNC: 1.6 MG/DL — HIGH (ref 0.2–1.2)
BILIRUB UR-MCNC: ABNORMAL
BILIRUB UR-MCNC: ABNORMAL
BUN SERPL-MCNC: 18 MG/DL — SIGNIFICANT CHANGE UP (ref 7–23)
BUN SERPL-MCNC: 18 MG/DL — SIGNIFICANT CHANGE UP (ref 7–23)
CALCIUM SERPL-MCNC: 10.3 MG/DL — HIGH (ref 8.5–10.1)
CALCIUM SERPL-MCNC: 10.3 MG/DL — HIGH (ref 8.5–10.1)
CHLORIDE SERPL-SCNC: 104 MMOL/L — SIGNIFICANT CHANGE UP (ref 96–108)
CHLORIDE SERPL-SCNC: 104 MMOL/L — SIGNIFICANT CHANGE UP (ref 96–108)
CO2 SERPL-SCNC: 28 MMOL/L — SIGNIFICANT CHANGE UP (ref 22–31)
CO2 SERPL-SCNC: 28 MMOL/L — SIGNIFICANT CHANGE UP (ref 22–31)
COLOR SPEC: ABNORMAL
COLOR SPEC: ABNORMAL
CREAT SERPL-MCNC: 1.12 MG/DL — SIGNIFICANT CHANGE UP (ref 0.5–1.3)
CREAT SERPL-MCNC: 1.12 MG/DL — SIGNIFICANT CHANGE UP (ref 0.5–1.3)
DACRYOCYTES BLD QL SMEAR: SLIGHT — SIGNIFICANT CHANGE UP
DACRYOCYTES BLD QL SMEAR: SLIGHT — SIGNIFICANT CHANGE UP
DIFF PNL FLD: NEGATIVE — SIGNIFICANT CHANGE UP
DIFF PNL FLD: NEGATIVE — SIGNIFICANT CHANGE UP
EGFR: 88 ML/MIN/1.73M2 — SIGNIFICANT CHANGE UP
EGFR: 88 ML/MIN/1.73M2 — SIGNIFICANT CHANGE UP
EOSINOPHIL # BLD AUTO: 0.16 K/UL — SIGNIFICANT CHANGE UP (ref 0–0.5)
EOSINOPHIL # BLD AUTO: 0.16 K/UL — SIGNIFICANT CHANGE UP (ref 0–0.5)
EOSINOPHIL NFR BLD AUTO: 1.6 % — SIGNIFICANT CHANGE UP (ref 0–6)
EOSINOPHIL NFR BLD AUTO: 1.6 % — SIGNIFICANT CHANGE UP (ref 0–6)
GIANT PLATELETS BLD QL SMEAR: PRESENT — SIGNIFICANT CHANGE UP
GIANT PLATELETS BLD QL SMEAR: PRESENT — SIGNIFICANT CHANGE UP
GLUCOSE SERPL-MCNC: 147 MG/DL — HIGH (ref 70–99)
GLUCOSE SERPL-MCNC: 147 MG/DL — HIGH (ref 70–99)
GLUCOSE UR QL: NEGATIVE MG/DL — SIGNIFICANT CHANGE UP
GLUCOSE UR QL: NEGATIVE MG/DL — SIGNIFICANT CHANGE UP
HCT VFR BLD CALC: 44 % — SIGNIFICANT CHANGE UP (ref 39–50)
HCT VFR BLD CALC: 44 % — SIGNIFICANT CHANGE UP (ref 39–50)
HGB BLD-MCNC: 13.6 G/DL — SIGNIFICANT CHANGE UP (ref 13–17)
HGB BLD-MCNC: 13.6 G/DL — SIGNIFICANT CHANGE UP (ref 13–17)
IMM GRANULOCYTES NFR BLD AUTO: 0.7 % — SIGNIFICANT CHANGE UP (ref 0–0.9)
IMM GRANULOCYTES NFR BLD AUTO: 0.7 % — SIGNIFICANT CHANGE UP (ref 0–0.9)
KETONES UR-MCNC: 15 MG/DL
KETONES UR-MCNC: 15 MG/DL
LEUKOCYTE ESTERASE UR-ACNC: ABNORMAL
LEUKOCYTE ESTERASE UR-ACNC: ABNORMAL
LG PLATELETS BLD QL AUTO: SLIGHT — SIGNIFICANT CHANGE UP
LG PLATELETS BLD QL AUTO: SLIGHT — SIGNIFICANT CHANGE UP
LIDOCAIN IGE QN: 26 U/L — SIGNIFICANT CHANGE UP (ref 13–75)
LIDOCAIN IGE QN: 26 U/L — SIGNIFICANT CHANGE UP (ref 13–75)
LYMPHOCYTES # BLD AUTO: 3.24 K/UL — SIGNIFICANT CHANGE UP (ref 1–3.3)
LYMPHOCYTES # BLD AUTO: 3.24 K/UL — SIGNIFICANT CHANGE UP (ref 1–3.3)
LYMPHOCYTES # BLD AUTO: 32.3 % — SIGNIFICANT CHANGE UP (ref 13–44)
LYMPHOCYTES # BLD AUTO: 32.3 % — SIGNIFICANT CHANGE UP (ref 13–44)
MANUAL SMEAR VERIFICATION: SIGNIFICANT CHANGE UP
MANUAL SMEAR VERIFICATION: SIGNIFICANT CHANGE UP
MCHC RBC-ENTMCNC: 20.3 PG — LOW (ref 27–34)
MCHC RBC-ENTMCNC: 20.3 PG — LOW (ref 27–34)
MCHC RBC-ENTMCNC: 30.9 GM/DL — LOW (ref 32–36)
MCHC RBC-ENTMCNC: 30.9 GM/DL — LOW (ref 32–36)
MCV RBC AUTO: 65.7 FL — LOW (ref 80–100)
MCV RBC AUTO: 65.7 FL — LOW (ref 80–100)
MICROCYTES BLD QL: SLIGHT — SIGNIFICANT CHANGE UP
MICROCYTES BLD QL: SLIGHT — SIGNIFICANT CHANGE UP
MONOCYTES # BLD AUTO: 0.88 K/UL — SIGNIFICANT CHANGE UP (ref 0–0.9)
MONOCYTES # BLD AUTO: 0.88 K/UL — SIGNIFICANT CHANGE UP (ref 0–0.9)
MONOCYTES NFR BLD AUTO: 8.8 % — SIGNIFICANT CHANGE UP (ref 2–14)
MONOCYTES NFR BLD AUTO: 8.8 % — SIGNIFICANT CHANGE UP (ref 2–14)
NEUTROPHILS # BLD AUTO: 5.62 K/UL — SIGNIFICANT CHANGE UP (ref 1.8–7.4)
NEUTROPHILS # BLD AUTO: 5.62 K/UL — SIGNIFICANT CHANGE UP (ref 1.8–7.4)
NEUTROPHILS NFR BLD AUTO: 55.9 % — SIGNIFICANT CHANGE UP (ref 43–77)
NEUTROPHILS NFR BLD AUTO: 55.9 % — SIGNIFICANT CHANGE UP (ref 43–77)
NITRITE UR-MCNC: NEGATIVE — SIGNIFICANT CHANGE UP
NITRITE UR-MCNC: NEGATIVE — SIGNIFICANT CHANGE UP
PH UR: 5.5 — SIGNIFICANT CHANGE UP (ref 5–8)
PH UR: 5.5 — SIGNIFICANT CHANGE UP (ref 5–8)
PLAT MORPH BLD: ABNORMAL
PLAT MORPH BLD: ABNORMAL
PLATELET # BLD AUTO: 559 K/UL — HIGH (ref 150–400)
PLATELET # BLD AUTO: 559 K/UL — HIGH (ref 150–400)
PLATELET COUNT - ESTIMATE: ABNORMAL
PLATELET COUNT - ESTIMATE: ABNORMAL
POTASSIUM SERPL-MCNC: 4.2 MMOL/L — SIGNIFICANT CHANGE UP (ref 3.5–5.3)
POTASSIUM SERPL-MCNC: 4.2 MMOL/L — SIGNIFICANT CHANGE UP (ref 3.5–5.3)
POTASSIUM SERPL-SCNC: 4.2 MMOL/L — SIGNIFICANT CHANGE UP (ref 3.5–5.3)
POTASSIUM SERPL-SCNC: 4.2 MMOL/L — SIGNIFICANT CHANGE UP (ref 3.5–5.3)
PROT SERPL-MCNC: 9.3 GM/DL — HIGH (ref 6–8.3)
PROT SERPL-MCNC: 9.3 GM/DL — HIGH (ref 6–8.3)
PROT UR-MCNC: 30 MG/DL
PROT UR-MCNC: 30 MG/DL
RBC # BLD: 6.7 M/UL — HIGH (ref 4.2–5.8)
RBC # BLD: 6.7 M/UL — HIGH (ref 4.2–5.8)
RBC # FLD: 17.2 % — HIGH (ref 10.3–14.5)
RBC # FLD: 17.2 % — HIGH (ref 10.3–14.5)
RBC BLD AUTO: ABNORMAL
RBC BLD AUTO: ABNORMAL
RBC CASTS # UR COMP ASSIST: 1 /HPF — SIGNIFICANT CHANGE UP (ref 0–4)
RBC CASTS # UR COMP ASSIST: 1 /HPF — SIGNIFICANT CHANGE UP (ref 0–4)
SODIUM SERPL-SCNC: 141 MMOL/L — SIGNIFICANT CHANGE UP (ref 135–145)
SODIUM SERPL-SCNC: 141 MMOL/L — SIGNIFICANT CHANGE UP (ref 135–145)
SP GR SPEC: 1.02 — SIGNIFICANT CHANGE UP (ref 1–1.03)
SP GR SPEC: 1.02 — SIGNIFICANT CHANGE UP (ref 1–1.03)
SQUAMOUS # UR AUTO: 1 /HPF — SIGNIFICANT CHANGE UP (ref 0–5)
SQUAMOUS # UR AUTO: 1 /HPF — SIGNIFICANT CHANGE UP (ref 0–5)
UROBILINOGEN FLD QL: 1 MG/DL — SIGNIFICANT CHANGE UP (ref 0.2–1)
UROBILINOGEN FLD QL: 1 MG/DL — SIGNIFICANT CHANGE UP (ref 0.2–1)
WBC # BLD: 10.04 K/UL — SIGNIFICANT CHANGE UP (ref 3.8–10.5)
WBC # BLD: 10.04 K/UL — SIGNIFICANT CHANGE UP (ref 3.8–10.5)
WBC # FLD AUTO: 10.04 K/UL — SIGNIFICANT CHANGE UP (ref 3.8–10.5)
WBC # FLD AUTO: 10.04 K/UL — SIGNIFICANT CHANGE UP (ref 3.8–10.5)
WBC UR QL: 1 /HPF — SIGNIFICANT CHANGE UP (ref 0–5)
WBC UR QL: 1 /HPF — SIGNIFICANT CHANGE UP (ref 0–5)

## 2023-11-23 PROCEDURE — 76705 ECHO EXAM OF ABDOMEN: CPT | Mod: 26

## 2023-11-23 PROCEDURE — 81001 URINALYSIS AUTO W/SCOPE: CPT

## 2023-11-23 PROCEDURE — 99284 EMERGENCY DEPT VISIT MOD MDM: CPT | Mod: 25

## 2023-11-23 PROCEDURE — 96375 TX/PRO/DX INJ NEW DRUG ADDON: CPT

## 2023-11-23 PROCEDURE — 96374 THER/PROPH/DIAG INJ IV PUSH: CPT

## 2023-11-23 PROCEDURE — 83690 ASSAY OF LIPASE: CPT

## 2023-11-23 PROCEDURE — 76705 ECHO EXAM OF ABDOMEN: CPT

## 2023-11-23 PROCEDURE — 99284 EMERGENCY DEPT VISIT MOD MDM: CPT

## 2023-11-23 PROCEDURE — 80053 COMPREHEN METABOLIC PANEL: CPT

## 2023-11-23 PROCEDURE — 36415 COLL VENOUS BLD VENIPUNCTURE: CPT

## 2023-11-23 PROCEDURE — 85025 COMPLETE CBC W/AUTO DIFF WBC: CPT

## 2023-11-23 RX ORDER — METOCLOPRAMIDE HCL 10 MG
1 TABLET ORAL
Qty: 12 | Refills: 0
Start: 2023-11-23 | End: 2023-11-26

## 2023-11-23 RX ORDER — FAMOTIDINE 10 MG/ML
20 INJECTION INTRAVENOUS ONCE
Refills: 0 | Status: COMPLETED | OUTPATIENT
Start: 2023-11-23 | End: 2023-11-23

## 2023-11-23 RX ORDER — DIPHENHYDRAMINE HCL 50 MG
50 CAPSULE ORAL ONCE
Refills: 0 | Status: COMPLETED | OUTPATIENT
Start: 2023-11-23 | End: 2023-11-23

## 2023-11-23 RX ORDER — METOCLOPRAMIDE HCL 10 MG
10 TABLET ORAL ONCE
Refills: 0 | Status: COMPLETED | OUTPATIENT
Start: 2023-11-23 | End: 2023-11-23

## 2023-11-23 RX ORDER — ONDANSETRON 8 MG/1
4 TABLET, FILM COATED ORAL ONCE
Refills: 0 | Status: COMPLETED | OUTPATIENT
Start: 2023-11-23 | End: 2023-11-23

## 2023-11-23 RX ORDER — SODIUM CHLORIDE 9 MG/ML
2000 INJECTION INTRAMUSCULAR; INTRAVENOUS; SUBCUTANEOUS ONCE
Refills: 0 | Status: COMPLETED | OUTPATIENT
Start: 2023-11-23 | End: 2023-11-23

## 2023-11-23 RX ADMIN — ONDANSETRON 4 MILLIGRAM(S): 8 TABLET, FILM COATED ORAL at 20:41

## 2023-11-23 RX ADMIN — FAMOTIDINE 20 MILLIGRAM(S): 10 INJECTION INTRAVENOUS at 20:40

## 2023-11-23 RX ADMIN — Medication 50 MILLIGRAM(S): at 21:43

## 2023-11-23 RX ADMIN — SODIUM CHLORIDE 2000 MILLILITER(S): 9 INJECTION INTRAMUSCULAR; INTRAVENOUS; SUBCUTANEOUS at 20:41

## 2023-11-23 RX ADMIN — Medication 10 MILLIGRAM(S): at 21:43

## 2023-11-23 NOTE — ED STATDOCS - PATIENT PORTAL LINK FT
You can access the FollowMyHealth Patient Portal offered by [facility] by registering at the following website: [email or web address]. By joining [employer]’s FollowMyHealth portal, you will also be able to view your health information using other applications (apps) compatible with our system. You can access the FollowMyHealth Patient Portal offered by [facility] by registering at the following website: [email or web address]. By joining [employer]’s FollowMyHealth portal, you will also be able to view your health information using other applications (apps) compatible with our system. You can access the FollowMyHealth Patient Portal offered by [facility] by registering at the following website: [email or web address]. By joining [employer]’s FollowMyHealth portal, you will also be able to view your health information using other applications (apps) compatible with our system.

## 2023-11-23 NOTE — ED STATDOCS - NS ED ATTENDING STATEMENT MOD
no
This was a shared visit with the BELL. I reviewed and verified the documentation and independently performed the documented:

## 2023-11-23 NOTE — ED STATDOCS - PROGRESS NOTE DETAILS
JG: Patient states no pain but nausea still 8/10 after IV zofran.  Reglan ordered. Reviewed all labs with him.  He states his liver enzymes are usually elevated from fatty liver.  Recent outpatient bloodwork on his phone shows Tbili 1.2 and AST and ALT in 120s/150s.  Today LFTs slightly higher than usual in 200s.  US of RUQ ordered to r/o gallstones, biliary obstruction.  If negative and tolerating PO, patient can f/u with PMD or GI. RUQ US with fatty liver which pt is aware of, he will f/u with his GI regarding liver and elevated LFTs, +gallstones, no acute ross, pt feeling better tolerating PO will dc home with antiemetics outpt f/u, strict return precautions pt agreeable to dc and plan of care  Lilo Jacob PA-C RUQ US with fatty liver which pt is aware of, he will f/u with his GI regarding liver and elevated LFTs, +gallstones, no acute ross, pt feeling better tolerating PO will dc home with antiemetics outpt f/u, strict return precautions pt agreeable to dc and plan of care  Lilo Jaocb PA-C

## 2023-11-23 NOTE — ED ADULT TRIAGE NOTE - CHIEF COMPLAINT QUOTE
pt ambulatory to ED c/o vomiting and abdominal pain x36 hours. took zofran at 2p without relief. went to urgent care, told to come to ED for fluids

## 2023-11-23 NOTE — ED STATDOCS - ATTENDING APP SHARED VISIT CONTRIBUTION OF CARE
Attending Contribution to Care: I, Roseline De Jesus, performed the initial face to face bedside interview with this patient regarding history of present illness, review of symptoms and relevant past medical, social and family history.  I completed an independent physical examination.  I was the initial provider who evaluated this patient. I have signed out the follow up of any pending tests (i.e. labs, radiological studies) to the ACP.  I have communicated the patient’s plan of care and disposition with the ACP.

## 2023-11-23 NOTE — ED STATDOCS - OBJECTIVE STATEMENT
36 yo male hx of sleep apnea, hernia repairs, fatty liver presents to the ED c/o vomiting and abdominal pain yesterday 12pm. Pt took zofran at 2p without relief. Pt went to urgent care, told to come to ED for fluids. No GI illness at home. Pt denies coughing, abd pain, travel. Pt took bactrim for impetigo him and son have it. Pt had 2 loose formed BM.

## 2023-11-23 NOTE — ED STATDOCS - CLINICAL SUMMARY MEDICAL DECISION MAKING FREE TEXT BOX
36 yo male with vomiting x 2 days without fever, abd pain. Possible viral will give Zofran, Pepcid, labs and reevaluation plan. If pt can tolerate PO dc with f/u. 38 yo male with vomiting x 2 days without fever, abd pain. Possible viral will give Zofran, Pepcid, labs and reevaluation plan. If pt can tolerate PO dc with f/u.

## 2023-11-23 NOTE — ED STATDOCS - ENMT, MLM
Nasal mucosa clear.  Throat has no vesicles, no oropharyngeal exudates and uvula is midline. slightly dry tongue

## 2024-01-16 ENCOUNTER — OUTPATIENT (OUTPATIENT)
Dept: OUTPATIENT SERVICES | Facility: HOSPITAL | Age: 39
LOS: 1 days | End: 2024-01-16
Payer: COMMERCIAL

## 2024-01-16 DIAGNOSIS — G47.33 OBSTRUCTIVE SLEEP APNEA (ADULT) (PEDIATRIC): ICD-10-CM

## 2024-01-16 DIAGNOSIS — Z98.890 OTHER SPECIFIED POSTPROCEDURAL STATES: Chronic | ICD-10-CM

## 2024-01-16 PROCEDURE — 95810 POLYSOM 6/> YRS 4/> PARAM: CPT

## 2024-01-16 PROCEDURE — 95810 POLYSOM 6/> YRS 4/> PARAM: CPT | Mod: 26

## 2024-01-19 NOTE — PATIENT PROFILE BEHAVIORAL HEALTH - AS SC BRADEN SENSORY
Pt wants to schedule for Omnipod upgrade from Dash to 5. I explained she has the ability to do this online through Omnipod. Pt wants to come into office. I scheduled her for 1/30. Pt is currently using Dexcom G7 which is not compatible with Omnipod 5. She just received a shipment of G7's so she is not sure if insurance will cover G6. I said if she uses G7 it will have to be in manual mode.  She also said she does not have enough Dash pods to last until 1/30.     Can we see if we an order her more Dash pods an also G6 to see if it is covered by insurance.     Also I will be emailing you the Omnipod order on 1/22 to transfer settings from Dash to 5. Please return to me and Yany by 1/22. Thanks     Also please issue a referral for Omnipod training.   
Referral placed. Will work on the rest. Thank you.   
(4) no impairment

## 2024-03-19 NOTE — BH INPATIENT PSYCHIATRY PROGRESS NOTE - NSTXPROBDEPRES_PSY_ALL_CORE
Refill done.    Please assist pt to get her AWV scheduled with me on or after 5/31/24. ( We may need to call as my pts do not know much on mychart through active on it )      Thank you,  Amber Raya MD on 3/18/2024 at 8:33 PM     
DEPRESSIVE SYMPTOMS

## 2024-04-13 ENCOUNTER — NON-APPOINTMENT (OUTPATIENT)
Age: 39
End: 2024-04-13

## 2024-04-16 ENCOUNTER — APPOINTMENT (OUTPATIENT)
Dept: NEUROLOGY | Facility: CLINIC | Age: 39
End: 2024-04-16
Payer: COMMERCIAL

## 2024-04-16 VITALS
BODY MASS INDEX: 29.63 KG/M2 | HEIGHT: 70 IN | HEART RATE: 78 BPM | WEIGHT: 207 LBS | TEMPERATURE: 98.2 F | DIASTOLIC BLOOD PRESSURE: 79 MMHG | SYSTOLIC BLOOD PRESSURE: 117 MMHG

## 2024-04-16 DIAGNOSIS — G47.10 HYPERSOMNIA, UNSPECIFIED: ICD-10-CM

## 2024-04-16 PROCEDURE — 99213 OFFICE O/P EST LOW 20 MIN: CPT

## 2024-04-16 PROCEDURE — G2211 COMPLEX E/M VISIT ADD ON: CPT | Mod: NC,1L

## 2024-04-16 RX ORDER — ATORVASTATIN CALCIUM 10 MG/1
10 TABLET, FILM COATED ORAL DAILY
Refills: 0 | Status: ACTIVE | COMMUNITY

## 2024-04-16 RX ORDER — ALPRAZOLAM 0.5 MG/1
0.5 TABLET ORAL TWICE DAILY
Refills: 0 | Status: ACTIVE | COMMUNITY

## 2024-04-16 RX ORDER — TIRZEPATIDE 7.5 MG/.5ML
7.5 INJECTION, SOLUTION SUBCUTANEOUS
Refills: 0 | Status: ACTIVE | COMMUNITY

## 2024-04-16 NOTE — HISTORY OF PRESENT ILLNESS
[FreeTextEntry1] : 9/11/23:  He is sleeping ~ 8 hours per night but finds that it is interrupted. He started using CPAP again in March for about six weeks. His  said that he was not snoring as much and he stopped using it. He did not feel more refreshed during that six weeks but does recall having fewer interruptions.  He had a consultation for Inspire. He was told that he did not meet criteria because his last sleep study only showed mild PRASANNA. He was told to repeat a sleep study. He has not had time because he started a new job at the [employer].  Sunosi is working well. he does not find that it works as well as Adderall but he has fewer side effects.  Bipolar is relatively stable.  4/16/22: He went to see Dr. Whitehead to be evaluated for Inspire due to intolerance to CPAP. He was referred for another sleep study.  His partner reports snoring.  He had a PSG on 1/16/24 showing an AHI of 25.7. He has not been using CPAP. He is in bed for 8-9 hours per night but sleep is fragmented. He is not finding Sunosi to be as helpful as it used to be and he is using it sparingly.  He has not had recent sleep paralysis, hypnagogic/hypnopompic hallucinations, dream enactment or cataplexy. Sometimes it is hard to determine what is a dream and what is reality.  He is working from home.

## 2024-04-16 NOTE — DATA REVIEWED
[de-identified] : MRI brain 7/17/19: Unremarkable [de-identified] : Blood tests 10/25/18: TSH 1.27  blood tests 7/3/19: TSH 0.6, Hb 11.9 with MCV 65, MCH 19.6, MCHC 30.2, ferritin 326, vitamin B12 404  Polysomnogram 9/9/19: Mild PRASANNA with AHI of 12.5, REM latency 179.5 MSLT 9/10/19: Man sleep latency 8.2 minutes. NO sleep onset REM periods  CPAP titration report 10/7/19: effective at CPAP 7 cm H2O using a Simplus Full face mask size small  Home sleep study 1/5/21: AHI 9.9. Supine AHI 19.9. Non-supine AHI 3.3  MRI cervical and thoracic spine 8/8/22:  Diffusely low T1 marrow signal throughout the visualized spine, which may be related to red marrow hyperplasia from anemia or be related to other blood dyscrasia, such as a myeloproliferative or lymphoproliferative process. Correlation with CBC may be helpful.  Multilevel degenerative changes throughout the cervical spine, as detailed above, worst at C4-C5, where there is mild/moderate left neural foraminal narrowing. No significant central canal stenosis at any level.  Indentation of the the posterior cord at the level of T4, possibly extending to the level of T9-T10, which may represent an underlying arachnoid cyst/web.  Multilevel degenerative changes throughout the thoracic spine, worst at T6-7, where there is a posterior disc osteophyte complex, which slightly indents the thoracic cord ventrally. No significant neural foraminal narrowing at any level.  PSG 1/16/24: AHI 25.7

## 2024-04-16 NOTE — DISCUSSION/SUMMARY
[FreeTextEntry1] : Arnulfo Alvarez is a 38 year old man with chronic hypersomnia.  PRASANNA - He is s/p tonsillectomy. Most recent PSG showed moderate PRASANNA, AHI 25.7. He is intolerant of CPAP. f/u with ENT regarding possible Inspire.  Hypersomnia - He has had chronic hypersomnia (even with treatment of PRASANNA in the past).  Repeat PSG and MSLT were not suggestive of Narcolepsy, even when PSG was done without CPAP. His presentation is more suggestive of idiopathic hypersomnia given his sleep inertia. MRI brain was unremarkable. At this time it is difficult to know what is causing his hypersomnia since PRASANNA is untreated. Can continue to use Sunosi 150 mg/day prn. Avoid driving when sleepy.    f/u 6 months, sooner if needed.

## 2024-04-19 ENCOUNTER — APPOINTMENT (OUTPATIENT)
Dept: PULMONOLOGY | Facility: CLINIC | Age: 39
End: 2024-04-19
Payer: COMMERCIAL

## 2024-04-19 VITALS
OXYGEN SATURATION: 95 % | SYSTOLIC BLOOD PRESSURE: 120 MMHG | DIASTOLIC BLOOD PRESSURE: 72 MMHG | HEART RATE: 75 BPM | RESPIRATION RATE: 16 BRPM | WEIGHT: 207 LBS | HEIGHT: 70 IN | BODY MASS INDEX: 29.63 KG/M2

## 2024-04-19 DIAGNOSIS — E66.3 OVERWEIGHT: ICD-10-CM

## 2024-04-19 DIAGNOSIS — Z86.16 PERSONAL HISTORY OF COVID-19: ICD-10-CM

## 2024-04-19 DIAGNOSIS — G47.33 OBSTRUCTIVE SLEEP APNEA (ADULT) (PEDIATRIC): ICD-10-CM

## 2024-04-19 PROCEDURE — G2211 COMPLEX E/M VISIT ADD ON: CPT | Mod: NC,1L

## 2024-04-19 PROCEDURE — 99204 OFFICE O/P NEW MOD 45 MIN: CPT

## 2024-05-08 ENCOUNTER — TRANSCRIPTION ENCOUNTER (OUTPATIENT)
Age: 39
End: 2024-05-08

## 2024-05-17 NOTE — ED ADULT NURSE NOTE - NS_BH TRG QUESTION8_ED_ALL_ED
Outgoing call to patient, she would like to come in to be evaluated for sake of mind. Coming in on 5/22/2024 at 915.   No

## 2024-06-19 NOTE — BH INPATIENT PSYCHIATRY DISCHARGE NOTE - NSBHDCSIGEVENTSFT_PSY_A_CORE
scaly skin.).   Allergic/Immunologic: Negative.    Neurological:  Positive for numbness (and tingling in skin of distal LE when swollen.).   Hematological: Negative.    Psychiatric/Behavioral: Negative.         Physical Exam  Vitals and nursing note reviewed.   Constitutional:       General: She is not in acute distress.     Appearance: She is well-developed. She is obese.   HENT:      Head: Normocephalic and atraumatic.      Right Ear: Tympanic membrane, ear canal and external ear normal.      Left Ear: Tympanic membrane, ear canal and external ear normal.      Nose: Nose normal.      Mouth/Throat:      Mouth: Mucous membranes are moist.      Pharynx: Oropharynx is clear. No oropharyngeal exudate or posterior oropharyngeal erythema.   Eyes:      General:         Right eye: No discharge.         Left eye: No discharge.      Extraocular Movements: Extraocular movements intact.      Conjunctiva/sclera: Conjunctivae normal.      Pupils: Pupils are equal, round, and reactive to light.   Neck:      Thyroid: No thyromegaly.      Vascular: No JVD.   Cardiovascular:      Rate and Rhythm: Normal rate and regular rhythm.      Pulses: Normal pulses.      Heart sounds: Normal heart sounds. No murmur heard.     No friction rub. No gallop.   Pulmonary:      Effort: Pulmonary effort is normal. No respiratory distress.      Breath sounds: Normal breath sounds. No wheezing, rhonchi or rales.   Abdominal:      General: Bowel sounds are normal.      Palpations: Abdomen is soft. There is no mass.      Tenderness: There is no abdominal tenderness. There is no guarding or rebound.   Musculoskeletal:         General: No tenderness. Normal range of motion.      Cervical back: Normal range of motion and neck supple.      Right lower leg: Edema (1-2) present.      Left lower leg: Edema (1-2) present.   Lymphadenopathy:      Cervical: No cervical adenopathy.   Skin:     General: Skin is warm and dry.      Capillary Refill: Capillary refill  none

## 2024-07-08 ENCOUNTER — APPOINTMENT (OUTPATIENT)
Dept: PULMONOLOGY | Facility: CLINIC | Age: 39
End: 2024-07-08

## 2024-07-30 NOTE — ED PROVIDER NOTE - DURATION
Referral Note:    All outreach attempts have been made to coordinate scheduling on the patient's Service to Neurology order in workqueue [id number] requested on 7/15/2024 have been conducted.  Unable to contact patient    Please note the referral is valid 1 year from entry date, it is just removed from our active workqueue.  Please contact Deep if further coordination is needed.    
day(s)

## 2024-09-09 ENCOUNTER — TRANSCRIPTION ENCOUNTER (OUTPATIENT)
Age: 39
End: 2024-09-09

## 2024-09-10 ENCOUNTER — APPOINTMENT (OUTPATIENT)
Dept: PULMONOLOGY | Facility: CLINIC | Age: 39
End: 2024-09-10
Payer: COMMERCIAL

## 2024-09-10 VITALS — WEIGHT: 192 LBS | BODY MASS INDEX: 27.49 KG/M2 | HEIGHT: 70 IN

## 2024-09-10 VITALS
HEART RATE: 87 BPM | DIASTOLIC BLOOD PRESSURE: 74 MMHG | OXYGEN SATURATION: 98 % | SYSTOLIC BLOOD PRESSURE: 110 MMHG | RESPIRATION RATE: 16 BRPM

## 2024-09-10 DIAGNOSIS — Z86.16 PERSONAL HISTORY OF COVID-19: ICD-10-CM

## 2024-09-10 DIAGNOSIS — E66.3 OVERWEIGHT: ICD-10-CM

## 2024-09-10 DIAGNOSIS — G47.00 INSOMNIA, UNSPECIFIED: ICD-10-CM

## 2024-09-10 DIAGNOSIS — G47.33 OBSTRUCTIVE SLEEP APNEA (ADULT) (PEDIATRIC): ICD-10-CM

## 2024-09-10 PROCEDURE — 99214 OFFICE O/P EST MOD 30 MIN: CPT

## 2024-09-10 NOTE — RESULTS/DATA
[TextEntry] : Home PSG from 1/17/17 revealed mild PRASANNA with an AHI of 9.2. PSG from 9/9/19 revealed mild PRASANNA with an AHI of 12.5. MSLT from 9/10/19 with mildly reduced SL of 8.2 min but no SOREMs. CPAP titration study performed on 10/7/19 revealed an optimal pressure of 7 cm of water.  Home PSG from 1/5/21 revealed mild PRASANNA with an AHI of 9.9. PSG from 1/16/24 revealed moderate PRASANNA with an AHI of 25.7.

## 2024-09-10 NOTE — HISTORY OF PRESENT ILLNESS
[Excessive Daytime Sleepiness] : excessive daytime sleepiness [Witnessed Apnea During Sleep] : witnessed apnea during sleep [Witnessed Gasping During Sleep] : witnessed gasping during sleep [Snoring] : snoring [Unrefreshing Sleep] : unrefreshing sleep [Sleepy When Sedentary] : sleepy when sedentary [CPAP] : CPAP [Poor Compliance] : poor compliance with treatment [Poor Tolerance] : poor tolerance of treatment [Follow-Up - Routine Clinic] : a routine clinic follow-up of [Excess Weight] : excess weight [Currently Experiencing] : The patient is currently experiencing symptoms. [None] : No associated symptoms are reported [Low Calorie Diet] : low calorie diet [Fair Compliance] : fair compliance with treatment [Fair Tolerance] : fair tolerance of treatment [Poor Symptom Control] : poor symptom control [Dyslipidemia] : dyslipidemia [Sleep Apnea] : sleep apnea [Diabetes] : diabetes [High] : high [Low Calorie] : low calorie [Well Balanced Diet] : well balanced meals [Infrequently] : exercises infrequently [Walking] : walking [TextBox_4] : Never smoker. S/p Covid infection in 2021 with mild symptoms. H/o PRASANNA and was tried on PAP therapy in the past but did not tolerate so is considering other options. S/p tonsillectomy. On Metformin. Follows with neurology for hypersomnolence and is maintained on Sunosi. Also, with h/o bipolar disorder which may be contributing to symptoms. Pt with moderate PRASANNA but not tolerating PAP therapy so would like to consider Inspire. [ESS] : 17 [TextBox_11] : 5

## 2024-09-10 NOTE — CONSULT LETTER
[Dear  ___] : Dear  [unfilled], [Consult Letter:] : I had the pleasure of evaluating your patient, [unfilled]. [Please see my note below.] : Please see my note below. [Consult Closing:] : Thank you very much for allowing me to participate in the care of this patient.  If you have any questions, please do not hesitate to contact me. [Sincerely,] : Sincerely, [FreeTextEntry3] : Julio Cesar Valles MD, FCCP, D. ABSM Pulmonary and Sleep Medicine [facility] at [place]

## 2024-09-10 NOTE — REASON FOR VISIT
[Follow-Up] : a follow-up visit [Sleep Apnea] : sleep apnea [Hypersomnolence] : hypersomnolence [TextBox_44] : weight issues, prior Covid infection.

## 2024-09-10 NOTE — PHYSICAL EXAM
[No Acute Distress] : no acute distress [Low Lying Soft Palate] : low lying soft palate [Enlarged Base of the Tongue] : enlarged base of the tongue [IV] : Mallampati Class: IV [Normal Appearance] : normal appearance [Supple] : supple [Normal Rate/Rhythm] : normal rate/rhythm [Normal S1, S2] : normal s1, s2 [No Murmurs] : no murmurs [No Resp Distress] : no resp distress [No Acc Muscle Use] : no acc muscle use [Normal Rhythm and Effort] : normal rhythm and effort [Clear to Auscultation Bilaterally] : clear to auscultation bilaterally [No Abnormalities] : no abnormalities [Benign] : benign [Not Tender] : not tender [Soft] : soft [No Clubbing] : no clubbing [No Edema] : no edema [No Focal Deficits] : no focal deficits [Oriented x3] : oriented x3 [TextBox_11] : mod to severe oropharyngeal crowding.

## 2024-09-10 NOTE — END OF VISIT
[Time Spent: ___ minutes] : I have spent [unfilled] minutes of time on the encounter which excludes teaching and separately reported services. [TextEntry] : Discussed with pt at length regarding PRASANNA, weight issues, prior Covid infection, hypersomnolence; reviewed w/u with pt as above.

## 2024-09-10 NOTE — DISCUSSION/SUMMARY
[FreeTextEntry1] : #1. Pt denies any h/o asthma, COPD or significant smoking hx. #2. The patient does not appear to require chronic BD therapy at this time. #3. Diet and exercise for weight loss would likely be helpful as pt with lower AHI results at lower weight. #4. SOBOE is likely at least somewhat related to weight or deconditioning.  #5. Pt did not autoCPAP to treat moderate PRASANNA with an AHI of 25.7 so will d/c PAP therapy per pt's request as he reports that he will not use PAP therapy. #6. ENT referral for Inspire therapy per pt's request. #7. Follows with neurology for hypersomnia and is maintained on Sunosi. #8. S/p Covid vaccines and boosters. S/p Covid infection. #9. F/u after ENT evaluation for Inspire therapy.  The patient expressed understanding and agreement with the above recommendations/plan and accepts responsibility to be compliant with recommended testing, therapies, and f/u visits. All relevant questions and concerns were addressed.

## 2024-09-10 NOTE — REVIEW OF SYSTEMS
[Fatigue] : fatigue [Seasonal Allergies] : seasonal allergies [GERD] : gerd [Depression] : depression [Anxiety] : anxiety [Diabetes] : diabetes [Negative] : Neurologic [TextBox_69] : On Pepcid [TextBox_110] : thalassemia

## 2024-10-07 ENCOUNTER — TRANSCRIPTION ENCOUNTER (OUTPATIENT)
Age: 39
End: 2024-10-07

## 2024-10-12 NOTE — BH PATIENT PROFILE - NSNUTRITIONASSESS_GEN_ALL_CORE
Patient requests all Lab, Cardiology, and Radiology Results on their Discharge Instructions Patient does not exhibit any risk factors

## 2024-11-07 ENCOUNTER — APPOINTMENT (OUTPATIENT)
Dept: PULMONOLOGY | Facility: CLINIC | Age: 39
End: 2024-11-07

## 2025-02-06 ENCOUNTER — APPOINTMENT (OUTPATIENT)
Dept: NEUROLOGY | Facility: CLINIC | Age: 40
End: 2025-02-06

## 2025-07-15 ENCOUNTER — APPOINTMENT (OUTPATIENT)
Dept: NEUROLOGY | Facility: CLINIC | Age: 40
End: 2025-07-15
Payer: COMMERCIAL

## 2025-07-15 VITALS
HEART RATE: 89 BPM | BODY MASS INDEX: 22.9 KG/M2 | RESPIRATION RATE: 15 BRPM | HEIGHT: 70 IN | SYSTOLIC BLOOD PRESSURE: 110 MMHG | DIASTOLIC BLOOD PRESSURE: 69 MMHG | WEIGHT: 160 LBS

## 2025-07-15 PROBLEM — R79.89 ELEVATED PROLACTIN LEVEL: Status: ACTIVE | Noted: 2025-07-15

## 2025-07-15 PROBLEM — G47.19 EXCESSIVE DAYTIME SLEEPINESS: Status: ACTIVE | Noted: 2025-07-15

## 2025-07-15 PROBLEM — R79.89 PROLACTIN INCREASED: Status: ACTIVE | Noted: 2025-07-15

## 2025-07-15 PROBLEM — R37 SEXUAL DYSFUNCTION: Status: ACTIVE | Noted: 2025-07-15

## 2025-07-15 PROCEDURE — 99214 OFFICE O/P EST MOD 30 MIN: CPT

## 2025-07-15 PROCEDURE — G2211 COMPLEX E/M VISIT ADD ON: CPT | Mod: NC

## 2025-08-07 ENCOUNTER — OUTPATIENT (OUTPATIENT)
Dept: OUTPATIENT SERVICES | Facility: HOSPITAL | Age: 40
LOS: 1 days | End: 2025-08-07
Payer: COMMERCIAL

## 2025-08-07 ENCOUNTER — APPOINTMENT (OUTPATIENT)
Dept: MRI IMAGING | Facility: CLINIC | Age: 40
End: 2025-08-07
Payer: COMMERCIAL

## 2025-08-07 DIAGNOSIS — R79.89 OTHER SPECIFIED ABNORMAL FINDINGS OF BLOOD CHEMISTRY: ICD-10-CM

## 2025-08-07 DIAGNOSIS — Z98.890 OTHER SPECIFIED POSTPROCEDURAL STATES: Chronic | ICD-10-CM

## 2025-08-07 PROCEDURE — 72148 MRI LUMBAR SPINE W/O DYE: CPT | Mod: 26

## 2025-08-07 PROCEDURE — 70553 MRI BRAIN STEM W/O & W/DYE: CPT | Mod: 26

## 2025-08-07 PROCEDURE — 72148 MRI LUMBAR SPINE W/O DYE: CPT

## 2025-08-07 PROCEDURE — A9585: CPT

## 2025-08-07 PROCEDURE — 70553 MRI BRAIN STEM W/O & W/DYE: CPT

## 2025-08-11 ENCOUNTER — TRANSCRIPTION ENCOUNTER (OUTPATIENT)
Age: 40
End: 2025-08-11

## 2025-08-18 ENCOUNTER — TRANSCRIPTION ENCOUNTER (OUTPATIENT)
Age: 40
End: 2025-08-18

## 2025-08-21 ENCOUNTER — TRANSCRIPTION ENCOUNTER (OUTPATIENT)
Age: 40
End: 2025-08-21